# Patient Record
Sex: MALE | Race: OTHER | HISPANIC OR LATINO
[De-identification: names, ages, dates, MRNs, and addresses within clinical notes are randomized per-mention and may not be internally consistent; named-entity substitution may affect disease eponyms.]

---

## 2017-01-11 ENCOUNTER — RX RENEWAL (OUTPATIENT)
Age: 52
End: 2017-01-11

## 2017-01-31 ENCOUNTER — APPOINTMENT (OUTPATIENT)
Dept: HEART AND VASCULAR | Facility: CLINIC | Age: 52
End: 2017-01-31

## 2017-01-31 VITALS
HEIGHT: 69 IN | TEMPERATURE: 98.5 F | SYSTOLIC BLOOD PRESSURE: 110 MMHG | OXYGEN SATURATION: 98 % | BODY MASS INDEX: 29.19 KG/M2 | HEART RATE: 80 BPM | WEIGHT: 197.1 LBS | DIASTOLIC BLOOD PRESSURE: 80 MMHG | RESPIRATION RATE: 12 BRPM

## 2017-02-16 ENCOUNTER — APPOINTMENT (OUTPATIENT)
Dept: HEART AND VASCULAR | Facility: CLINIC | Age: 52
End: 2017-02-16

## 2017-02-16 VITALS
SYSTOLIC BLOOD PRESSURE: 130 MMHG | HEIGHT: 69 IN | TEMPERATURE: 97.3 F | WEIGHT: 197 LBS | BODY MASS INDEX: 29.18 KG/M2 | HEART RATE: 80 BPM | DIASTOLIC BLOOD PRESSURE: 84 MMHG | OXYGEN SATURATION: 95 %

## 2017-02-17 LAB
25(OH)D3 SERPL-MCNC: 29.9 NG/ML
ALBUMIN SERPL ELPH-MCNC: 4.5 G/DL
ALP BLD-CCNC: 74 U/L
ALT SERPL-CCNC: 29 U/L
ANION GAP SERPL CALC-SCNC: 14 MMOL/L
AST SERPL-CCNC: 20 U/L
BASOPHILS # BLD AUTO: 0.05 K/UL
BASOPHILS NFR BLD AUTO: 0.5 %
BILIRUB SERPL-MCNC: 0.4 MG/DL
BUN SERPL-MCNC: 18 MG/DL
CALCIUM SERPL-MCNC: 9.8 MG/DL
CHLORIDE SERPL-SCNC: 100 MMOL/L
CHOLEST SERPL-MCNC: 248 MG/DL
CHOLEST/HDLC SERPL: 5.4 RATIO
CO2 SERPL-SCNC: 27 MMOL/L
CREAT SERPL-MCNC: 1.02 MG/DL
EOSINOPHIL # BLD AUTO: 0.17 K/UL
EOSINOPHIL NFR BLD AUTO: 1.8 %
GLUCOSE SERPL-MCNC: 99 MG/DL
HBA1C MFR BLD HPLC: 5.6 %
HCT VFR BLD CALC: 43 %
HDLC SERPL-MCNC: 46 MG/DL
HGB BLD-MCNC: 13.8 G/DL
IMM GRANULOCYTES NFR BLD AUTO: 0.8 %
LDLC SERPL CALC-MCNC: 167 MG/DL
LYMPHOCYTES # BLD AUTO: 2.46 K/UL
LYMPHOCYTES NFR BLD AUTO: 25.9 %
MAN DIFF?: NORMAL
MCHC RBC-ENTMCNC: 31 PG
MCHC RBC-ENTMCNC: 32.1 GM/DL
MCV RBC AUTO: 96.6 FL
MONOCYTES # BLD AUTO: 0.71 K/UL
MONOCYTES NFR BLD AUTO: 7.5 %
NEUTROPHILS # BLD AUTO: 6.01 K/UL
NEUTROPHILS NFR BLD AUTO: 63.5 %
PLATELET # BLD AUTO: 261 K/UL
POTASSIUM SERPL-SCNC: 4.3 MMOL/L
PROT SERPL-MCNC: 8.5 G/DL
PSA SERPL-MCNC: 1.09 NG/ML
RBC # BLD: 4.45 M/UL
RBC # FLD: 13.5 %
SODIUM SERPL-SCNC: 141 MMOL/L
TESTOST SERPL-MCNC: 287.3 NG/DL
TRIGL SERPL-MCNC: 177 MG/DL
TSH SERPL-ACNC: 1.57 UIU/ML
WBC # FLD AUTO: 9.48 K/UL

## 2017-05-02 ENCOUNTER — RX RENEWAL (OUTPATIENT)
Age: 52
End: 2017-05-02

## 2017-06-05 ENCOUNTER — RX RENEWAL (OUTPATIENT)
Age: 52
End: 2017-06-05

## 2017-06-12 ENCOUNTER — RX RENEWAL (OUTPATIENT)
Age: 52
End: 2017-06-12

## 2017-06-26 ENCOUNTER — APPOINTMENT (OUTPATIENT)
Dept: HEART AND VASCULAR | Facility: CLINIC | Age: 52
End: 2017-06-26

## 2017-06-30 ENCOUNTER — APPOINTMENT (OUTPATIENT)
Dept: HEART AND VASCULAR | Facility: CLINIC | Age: 52
End: 2017-06-30

## 2017-08-07 ENCOUNTER — RX RENEWAL (OUTPATIENT)
Age: 52
End: 2017-08-07

## 2017-09-21 ENCOUNTER — RX RENEWAL (OUTPATIENT)
Age: 52
End: 2017-09-21

## 2017-09-22 ENCOUNTER — RX RENEWAL (OUTPATIENT)
Age: 52
End: 2017-09-22

## 2017-10-16 ENCOUNTER — RX RENEWAL (OUTPATIENT)
Age: 52
End: 2017-10-16

## 2017-11-01 PROBLEM — Z00.00 ENCOUNTER FOR PREVENTIVE HEALTH EXAMINATION: Noted: 2017-11-01

## 2017-11-13 ENCOUNTER — RX RENEWAL (OUTPATIENT)
Age: 52
End: 2017-11-13

## 2017-11-21 ENCOUNTER — RX RENEWAL (OUTPATIENT)
Age: 52
End: 2017-11-21

## 2017-11-28 ENCOUNTER — APPOINTMENT (OUTPATIENT)
Dept: HEART AND VASCULAR | Facility: CLINIC | Age: 52
End: 2017-11-28

## 2017-12-26 ENCOUNTER — RX RENEWAL (OUTPATIENT)
Age: 52
End: 2017-12-26

## 2018-01-02 ENCOUNTER — LABORATORY RESULT (OUTPATIENT)
Age: 53
End: 2018-01-02

## 2018-01-02 ENCOUNTER — APPOINTMENT (OUTPATIENT)
Dept: HEART AND VASCULAR | Facility: CLINIC | Age: 53
End: 2018-01-02
Payer: COMMERCIAL

## 2018-01-02 VITALS — SYSTOLIC BLOOD PRESSURE: 144 MMHG | DIASTOLIC BLOOD PRESSURE: 92 MMHG

## 2018-01-02 VITALS
DIASTOLIC BLOOD PRESSURE: 92 MMHG | BODY MASS INDEX: 29.62 KG/M2 | OXYGEN SATURATION: 97 % | WEIGHT: 200 LBS | TEMPERATURE: 98.3 F | HEIGHT: 69 IN | HEART RATE: 85 BPM | RESPIRATION RATE: 12 BRPM | SYSTOLIC BLOOD PRESSURE: 148 MMHG

## 2018-01-02 PROCEDURE — 93000 ELECTROCARDIOGRAM COMPLETE: CPT

## 2018-01-02 PROCEDURE — 36415 COLL VENOUS BLD VENIPUNCTURE: CPT

## 2018-01-02 PROCEDURE — 99214 OFFICE O/P EST MOD 30 MIN: CPT | Mod: 25

## 2018-01-03 DIAGNOSIS — R77.8 OTHER SPECIFIED ABNORMALITIES OF PLASMA PROTEINS: ICD-10-CM

## 2018-01-03 LAB
25(OH)D3 SERPL-MCNC: 16.6 NG/ML
ALBUMIN SERPL ELPH-MCNC: 4.8 G/DL
ALP BLD-CCNC: 68 U/L
ALT SERPL-CCNC: 26 U/L
ANION GAP SERPL CALC-SCNC: 15 MMOL/L
AST SERPL-CCNC: 22 U/L
BASOPHILS # BLD AUTO: 0.05 K/UL
BASOPHILS NFR BLD AUTO: 0.6 %
BILIRUB SERPL-MCNC: 0.3 MG/DL
BUN SERPL-MCNC: 17 MG/DL
CALCIUM SERPL-MCNC: 10.2 MG/DL
CHLORIDE SERPL-SCNC: 101 MMOL/L
CHOLEST SERPL-MCNC: 244 MG/DL
CHOLEST/HDLC SERPL: 6.1 RATIO
CO2 SERPL-SCNC: 27 MMOL/L
CREAT SERPL-MCNC: 1.06 MG/DL
EOSINOPHIL # BLD AUTO: 0.23 K/UL
EOSINOPHIL NFR BLD AUTO: 2.7 %
GLUCOSE SERPL-MCNC: 94 MG/DL
HBA1C MFR BLD HPLC: 5.4 %
HCT VFR BLD CALC: 44.4 %
HCV AB SER QL: NONREACTIVE
HCV S/CO RATIO: 0.12 S/CO
HDLC SERPL-MCNC: 40 MG/DL
HGB BLD-MCNC: 14.5 G/DL
IMM GRANULOCYTES NFR BLD AUTO: 0.2 %
LDLC SERPL CALC-MCNC: 139 MG/DL
LYMPHOCYTES # BLD AUTO: 2.11 K/UL
LYMPHOCYTES NFR BLD AUTO: 25.1 %
MAN DIFF?: NORMAL
MCHC RBC-ENTMCNC: 30.9 PG
MCHC RBC-ENTMCNC: 32.7 GM/DL
MCV RBC AUTO: 94.5 FL
MEV IGG FLD QL IA: 143 AU/ML
MEV IGG+IGM SER-IMP: POSITIVE
MONOCYTES # BLD AUTO: 0.93 K/UL
MONOCYTES NFR BLD AUTO: 11 %
MUV AB SER-ACNC: POSITIVE
MUV IGG SER QL IA: 12 AU/ML
NEUTROPHILS # BLD AUTO: 5.08 K/UL
NEUTROPHILS NFR BLD AUTO: 60.4 %
PLATELET # BLD AUTO: 219 K/UL
POTASSIUM SERPL-SCNC: 4.4 MMOL/L
PROT SERPL-MCNC: 8.4 G/DL
RBC # BLD: 4.7 M/UL
RBC # FLD: 12.3 %
RUBV IGG FLD-ACNC: 9.9 INDEX
RUBV IGG SER-IMP: POSITIVE
SODIUM SERPL-SCNC: 143 MMOL/L
TESTOST SERPL-MCNC: 338.9 NG/DL
TRIGL SERPL-MCNC: 323 MG/DL
TSH SERPL-ACNC: 3.53 UIU/ML
WBC # FLD AUTO: 8.42 K/UL

## 2018-02-08 ENCOUNTER — APPOINTMENT (OUTPATIENT)
Dept: HEART AND VASCULAR | Facility: CLINIC | Age: 53
End: 2018-02-08

## 2018-03-01 ENCOUNTER — APPOINTMENT (OUTPATIENT)
Dept: HEART AND VASCULAR | Facility: CLINIC | Age: 53
End: 2018-03-01
Payer: COMMERCIAL

## 2018-03-01 VITALS
WEIGHT: 206 LBS | RESPIRATION RATE: 12 BRPM | OXYGEN SATURATION: 98 % | DIASTOLIC BLOOD PRESSURE: 76 MMHG | HEART RATE: 69 BPM | SYSTOLIC BLOOD PRESSURE: 112 MMHG | TEMPERATURE: 97.9 F | HEIGHT: 69 IN | BODY MASS INDEX: 30.51 KG/M2

## 2018-03-01 PROCEDURE — 99214 OFFICE O/P EST MOD 30 MIN: CPT

## 2018-03-05 ENCOUNTER — RX RENEWAL (OUTPATIENT)
Age: 53
End: 2018-03-05

## 2018-06-06 ENCOUNTER — APPOINTMENT (OUTPATIENT)
Dept: HEART AND VASCULAR | Facility: CLINIC | Age: 53
End: 2018-06-06

## 2018-07-03 ENCOUNTER — APPOINTMENT (OUTPATIENT)
Dept: HEART AND VASCULAR | Facility: CLINIC | Age: 53
End: 2018-07-03

## 2018-07-09 ENCOUNTER — RX RENEWAL (OUTPATIENT)
Age: 53
End: 2018-07-09

## 2018-09-07 ENCOUNTER — RX RENEWAL (OUTPATIENT)
Age: 53
End: 2018-09-07

## 2018-11-12 ENCOUNTER — RX RENEWAL (OUTPATIENT)
Age: 53
End: 2018-11-12

## 2019-01-14 ENCOUNTER — RX RENEWAL (OUTPATIENT)
Age: 54
End: 2019-01-14

## 2019-02-12 ENCOUNTER — RX RENEWAL (OUTPATIENT)
Age: 54
End: 2019-02-12

## 2019-03-18 ENCOUNTER — RX RENEWAL (OUTPATIENT)
Age: 54
End: 2019-03-18

## 2019-06-11 ENCOUNTER — APPOINTMENT (OUTPATIENT)
Dept: HEART AND VASCULAR | Facility: CLINIC | Age: 54
End: 2019-06-11
Payer: COMMERCIAL

## 2019-06-11 VITALS — SYSTOLIC BLOOD PRESSURE: 164 MMHG | DIASTOLIC BLOOD PRESSURE: 100 MMHG

## 2019-06-11 VITALS
HEART RATE: 83 BPM | DIASTOLIC BLOOD PRESSURE: 102 MMHG | OXYGEN SATURATION: 6 % | HEIGHT: 67 IN | WEIGHT: 215 LBS | TEMPERATURE: 98.4 F | BODY MASS INDEX: 33.74 KG/M2 | RESPIRATION RATE: 14 BRPM | SYSTOLIC BLOOD PRESSURE: 164 MMHG

## 2019-06-11 DIAGNOSIS — Z00.00 ENCOUNTER FOR GENERAL ADULT MEDICAL EXAMINATION W/OUT ABNORMAL FINDINGS: ICD-10-CM

## 2019-06-11 PROCEDURE — 36415 COLL VENOUS BLD VENIPUNCTURE: CPT

## 2019-06-11 PROCEDURE — 93000 ELECTROCARDIOGRAM COMPLETE: CPT

## 2019-06-11 PROCEDURE — 99214 OFFICE O/P EST MOD 30 MIN: CPT

## 2019-06-11 NOTE — HISTORY OF PRESENT ILLNESS
[FreeTextEntry1] : 53 year male who ran out of medication a few days ago. He had insurance issues that interfered with his ability to followup. He notes having a swelling on his right elbow that is mildly uncomfortable.

## 2019-06-11 NOTE — PHYSICAL EXAM
[General Appearance - Well Developed] : well developed [Normal Appearance] : normal appearance [Well Groomed] : well groomed [General Appearance - Well Nourished] : well nourished [General Appearance - In No Acute Distress] : no acute distress [No Deformities] : no deformities [Normal Conjunctiva] : the conjunctiva exhibited no abnormalities [Normal Oral Mucosa] : normal oral mucosa [No Oral Pallor] : no oral pallor [No Oral Cyanosis] : no oral cyanosis [Respiration, Rhythm And Depth] : normal respiratory rhythm and effort [] : no respiratory distress [Auscultation Breath Sounds / Voice Sounds] : lungs were clear to auscultation bilaterally [Heart Sounds] : normal S1 and S2 [Exaggerated Use Of Accessory Muscles For Inspiration] : no accessory muscle use [Abdomen Soft] : soft [FreeTextEntry1] : right elbow with signs of bursa swelling [Abnormal Walk] : normal gait [Skin Turgor] : normal skin turgor [Oriented To Time, Place, And Person] : oriented to person, place, and time [Affect] : the affect was normal [Mood] : the mood was normal [No Anxiety] : not feeling anxious

## 2019-06-11 NOTE — DISCUSSION/SUMMARY
[FreeTextEntry1] : Hypertension, bursitis--Gradually restart Metoprolol first, then the Enalapril, then the HCTZ, then Nifedipine. Call if dizziness. Followup for BP check and labs followup in 2-3 weeks. Meet Dr Sepulveda in consultation

## 2019-06-12 LAB
25(OH)D3 SERPL-MCNC: 25.1 NG/ML
ALBUMIN SERPL ELPH-MCNC: 4.6 G/DL
ALP BLD-CCNC: 63 U/L
ALT SERPL-CCNC: 21 U/L
ANION GAP SERPL CALC-SCNC: 13 MMOL/L
APPEARANCE: CLEAR
AST SERPL-CCNC: 21 U/L
BASOPHILS # BLD AUTO: 0.05 K/UL
BASOPHILS NFR BLD AUTO: 0.6 %
BILIRUB SERPL-MCNC: 0.3 MG/DL
BILIRUBIN URINE: NEGATIVE
BLOOD URINE: NEGATIVE
BUN SERPL-MCNC: 18 MG/DL
CALCIUM SERPL-MCNC: 9.6 MG/DL
CHLORIDE SERPL-SCNC: 102 MMOL/L
CHOLEST SERPL-MCNC: 223 MG/DL
CHOLEST/HDLC SERPL: 6.2 RATIO
CO2 SERPL-SCNC: 25 MMOL/L
COLOR: NORMAL
CREAT SERPL-MCNC: 0.86 MG/DL
EOSINOPHIL # BLD AUTO: 0.28 K/UL
EOSINOPHIL NFR BLD AUTO: 3.6 %
ESTIMATED AVERAGE GLUCOSE: 117 MG/DL
GLUCOSE QUALITATIVE U: NEGATIVE
GLUCOSE SERPL-MCNC: 87 MG/DL
HBA1C MFR BLD HPLC: 5.7 %
HCT VFR BLD CALC: 44.4 %
HDLC SERPL-MCNC: 36 MG/DL
HGB BLD-MCNC: 13.9 G/DL
IMM GRANULOCYTES NFR BLD AUTO: 0.4 %
KETONES URINE: NEGATIVE
LDLC SERPL CALC-MCNC: 143 MG/DL
LEUKOCYTE ESTERASE URINE: NEGATIVE
LYMPHOCYTES # BLD AUTO: 2.14 K/UL
LYMPHOCYTES NFR BLD AUTO: 27.5 %
MAN DIFF?: NORMAL
MCHC RBC-ENTMCNC: 30.2 PG
MCHC RBC-ENTMCNC: 31.3 GM/DL
MCV RBC AUTO: 96.3 FL
MONOCYTES # BLD AUTO: 0.59 K/UL
MONOCYTES NFR BLD AUTO: 7.6 %
NEUTROPHILS # BLD AUTO: 4.7 K/UL
NEUTROPHILS NFR BLD AUTO: 60.3 %
NITRITE URINE: NEGATIVE
PH URINE: 6
PLATELET # BLD AUTO: 275 K/UL
POTASSIUM SERPL-SCNC: 4 MMOL/L
PROT SERPL-MCNC: 7.9 G/DL
PROTEIN URINE: NEGATIVE
RBC # BLD: 4.61 M/UL
RBC # FLD: 12.2 %
SODIUM SERPL-SCNC: 140 MMOL/L
SPECIFIC GRAVITY URINE: 1.02
TESTOST SERPL-MCNC: 203 NG/DL
TRIGL SERPL-MCNC: 219 MG/DL
TSH SERPL-ACNC: 2.15 UIU/ML
UROBILINOGEN URINE: NORMAL
WBC # FLD AUTO: 7.79 K/UL

## 2019-06-25 ENCOUNTER — APPOINTMENT (OUTPATIENT)
Dept: HEART AND VASCULAR | Facility: CLINIC | Age: 54
End: 2019-06-25

## 2019-07-03 ENCOUNTER — APPOINTMENT (OUTPATIENT)
Dept: HEART AND VASCULAR | Facility: CLINIC | Age: 54
End: 2019-07-03

## 2019-07-03 ENCOUNTER — APPOINTMENT (OUTPATIENT)
Dept: ORTHOPEDIC SURGERY | Facility: CLINIC | Age: 54
End: 2019-07-03
Payer: COMMERCIAL

## 2019-07-03 VITALS — WEIGHT: 215 LBS | BODY MASS INDEX: 33.74 KG/M2 | HEIGHT: 67 IN | RESPIRATION RATE: 14 BRPM

## 2019-07-03 PROCEDURE — 73070 X-RAY EXAM OF ELBOW: CPT | Mod: RT

## 2019-07-03 PROCEDURE — 99243 OFF/OP CNSLTJ NEW/EST LOW 30: CPT

## 2019-07-26 ENCOUNTER — APPOINTMENT (OUTPATIENT)
Dept: HEART AND VASCULAR | Facility: CLINIC | Age: 54
End: 2019-07-26
Payer: COMMERCIAL

## 2019-07-26 VITALS
BODY MASS INDEX: 32.81 KG/M2 | HEIGHT: 67 IN | DIASTOLIC BLOOD PRESSURE: 78 MMHG | OXYGEN SATURATION: 97 % | TEMPERATURE: 98.2 F | HEART RATE: 61 BPM | RESPIRATION RATE: 14 BRPM | SYSTOLIC BLOOD PRESSURE: 134 MMHG | WEIGHT: 209.03 LBS

## 2019-07-26 PROCEDURE — 99214 OFFICE O/P EST MOD 30 MIN: CPT

## 2019-07-26 NOTE — PHYSICAL EXAM
[General Appearance - Well Developed] : well developed [Normal Appearance] : normal appearance [Well Groomed] : well groomed [General Appearance - Well Nourished] : well nourished [No Deformities] : no deformities [General Appearance - In No Acute Distress] : no acute distress [Normal Conjunctiva] : the conjunctiva exhibited no abnormalities [] : no respiratory distress [Respiration, Rhythm And Depth] : normal respiratory rhythm and effort [Exaggerated Use Of Accessory Muscles For Inspiration] : no accessory muscle use [Auscultation Breath Sounds / Voice Sounds] : lungs were clear to auscultation bilaterally [Heart Sounds] : normal S1 and S2 [Abdomen Soft] : soft [Abnormal Walk] : normal gait [FreeTextEntry1] : mild right elbow swelling [Oriented To Time, Place, And Person] : oriented to person, place, and time [Skin Turgor] : normal skin turgor [Affect] : the affect was normal [Mood] : the mood was normal [No Anxiety] : not feeling anxious

## 2019-07-26 NOTE — HISTORY OF PRESENT ILLNESS
[FreeTextEntry1] : 53 year male who saw [facility] for his elbow. He is doing PT. He denies having any new symptoms

## 2019-07-26 NOTE — DISCUSSION/SUMMARY
[FreeTextEntry1] : Hypertension, hyperlipidemia--I asked him to return for Echo, Carotid and stress test

## 2019-09-24 ENCOUNTER — APPOINTMENT (OUTPATIENT)
Dept: HEART AND VASCULAR | Facility: CLINIC | Age: 54
End: 2019-09-24
Payer: COMMERCIAL

## 2019-09-24 VITALS
HEIGHT: 67 IN | TEMPERATURE: 97.7 F | HEART RATE: 81 BPM | OXYGEN SATURATION: 96 % | SYSTOLIC BLOOD PRESSURE: 152 MMHG | DIASTOLIC BLOOD PRESSURE: 92 MMHG | WEIGHT: 210 LBS | RESPIRATION RATE: 14 BRPM | BODY MASS INDEX: 32.96 KG/M2

## 2019-09-24 DIAGNOSIS — I34.0 NONRHEUMATIC MITRAL (VALVE) INSUFFICIENCY: ICD-10-CM

## 2019-09-24 PROCEDURE — 93306 TTE W/DOPPLER COMPLETE: CPT

## 2019-09-24 PROCEDURE — 93015 CV STRESS TEST SUPVJ I&R: CPT

## 2019-09-24 PROCEDURE — 93880 EXTRACRANIAL BILAT STUDY: CPT

## 2019-09-24 NOTE — PHYSICAL EXAM
[General Appearance - Well Developed] : well developed [Normal Appearance] : normal appearance [Well Groomed] : well groomed [No Deformities] : no deformities [General Appearance - Well Nourished] : well nourished [General Appearance - In No Acute Distress] : no acute distress [Normal Conjunctiva] : the conjunctiva exhibited no abnormalities [] : no respiratory distress [Respiration, Rhythm And Depth] : normal respiratory rhythm and effort [Auscultation Breath Sounds / Voice Sounds] : lungs were clear to auscultation bilaterally [Exaggerated Use Of Accessory Muscles For Inspiration] : no accessory muscle use [Abnormal Walk] : normal gait [Heart Sounds] : normal S1 and S2 [Skin Turgor] : normal skin turgor [Oriented To Time, Place, And Person] : oriented to person, place, and time [Affect] : the affect was normal [Mood] : the mood was normal [No Anxiety] : not feeling anxious [FreeTextEntry1] : no edema

## 2019-09-24 NOTE — DISCUSSION/SUMMARY
[FreeTextEntry1] : At the time of the patient's visit a Carotid Doppler was performed to evaluate for PVD\par \par At the time of the patient's visit an Echocardiogram was performed to evaluate his LV function\par \par At the time of the patient's visit a Stress Test was performed to evaluate for exercise induced arrhythmia and ischemia. \par \par At the time of the visit the results were reviewed with patient. Increase Metoprolol Succinate to 100 mg from 50 mg at bedtime

## 2019-12-06 ENCOUNTER — RX RENEWAL (OUTPATIENT)
Age: 54
End: 2019-12-06

## 2019-12-13 NOTE — REASON FOR VISIT
Intermittent nausea and vomiting throughout the night. Mild improvement with PRN medications. Complaints of heartburn and feeling bloated. BM attempted multiple times. VSS. Tolerating 4L NC even with frequent ambulation. Pt able to get a few hours of sleep after receiving morphine. Afebrile. UO adequate but decreased. No appetite. CBGs WNL. Mild chest pain improved with rest. Pt turned independently throughout the night. POC reviewed with pt and family. All questions and concerns addressed.      [Follow-Up - Clinic] : a clinic follow-up of [FreeTextEntry2] : hypertension

## 2020-01-10 ENCOUNTER — RX RENEWAL (OUTPATIENT)
Age: 55
End: 2020-01-10

## 2020-03-17 ENCOUNTER — RX RENEWAL (OUTPATIENT)
Age: 55
End: 2020-03-17

## 2020-05-12 ENCOUNTER — RX RENEWAL (OUTPATIENT)
Age: 55
End: 2020-05-12

## 2020-06-04 ENCOUNTER — RX RENEWAL (OUTPATIENT)
Age: 55
End: 2020-06-04

## 2020-09-08 ENCOUNTER — APPOINTMENT (OUTPATIENT)
Dept: HEART AND VASCULAR | Facility: CLINIC | Age: 55
End: 2020-09-08
Payer: COMMERCIAL

## 2020-09-08 VITALS
WEIGHT: 210 LBS | DIASTOLIC BLOOD PRESSURE: 84 MMHG | SYSTOLIC BLOOD PRESSURE: 130 MMHG | RESPIRATION RATE: 14 BRPM | OXYGEN SATURATION: 96 % | HEART RATE: 97 BPM | BODY MASS INDEX: 32.96 KG/M2 | HEIGHT: 67 IN | TEMPERATURE: 97.7 F

## 2020-09-08 PROCEDURE — 93306 TTE W/DOPPLER COMPLETE: CPT

## 2020-09-08 PROCEDURE — 93000 ELECTROCARDIOGRAM COMPLETE: CPT

## 2020-09-08 PROCEDURE — 93880 EXTRACRANIAL BILAT STUDY: CPT

## 2020-09-08 PROCEDURE — 36415 COLL VENOUS BLD VENIPUNCTURE: CPT

## 2020-09-08 PROCEDURE — 99214 OFFICE O/P EST MOD 30 MIN: CPT | Mod: 25

## 2020-09-08 NOTE — ASSESSMENT
[FreeTextEntry1] : At the time of the patient's visit a Carotid Doppler was performed to evaluate for PVD. At the time of the visit the results were reviewed with patient \par \par At the time of the patient's visit an Echocardiogram was performed to evaluate LV function. At the time of the visit the results were reviewed with patient \par \par Hyperlipidemia, hypertension, LVH, carotid plaque--labs drawn and sent. Start Crestor 5 mg daily

## 2020-09-08 NOTE — PHYSICAL EXAM
[Normal Appearance] : normal appearance [General Appearance - Well Developed] : well developed [Well Groomed] : well groomed [General Appearance - Well Nourished] : well nourished [No Deformities] : no deformities [Normal Conjunctiva] : the conjunctiva exhibited no abnormalities [General Appearance - In No Acute Distress] : no acute distress [] : no respiratory distress [Respiration, Rhythm And Depth] : normal respiratory rhythm and effort [Exaggerated Use Of Accessory Muscles For Inspiration] : no accessory muscle use [Auscultation Breath Sounds / Voice Sounds] : lungs were clear to auscultation bilaterally [Heart Sounds] : normal S1 and S2 [Abnormal Walk] : normal gait [FreeTextEntry1] : no edema [Skin Turgor] : normal skin turgor [Oriented To Time, Place, And Person] : oriented to person, place, and time [Affect] : the affect was normal [Mood] : the mood was normal [No Anxiety] : not feeling anxious

## 2020-09-09 LAB
25(OH)D3 SERPL-MCNC: 36.1 NG/ML
ALBUMIN SERPL ELPH-MCNC: 4.6 G/DL
ALP BLD-CCNC: 80 U/L
ALT SERPL-CCNC: 23 U/L
ANION GAP SERPL CALC-SCNC: 16 MMOL/L
AST SERPL-CCNC: 22 U/L
BASOPHILS # BLD AUTO: 0.04 K/UL
BASOPHILS NFR BLD AUTO: 0.4 %
BILIRUB SERPL-MCNC: 0.2 MG/DL
BUN SERPL-MCNC: 18 MG/DL
CALCIUM SERPL-MCNC: 9.5 MG/DL
CHLORIDE SERPL-SCNC: 105 MMOL/L
CHOLEST SERPL-MCNC: 205 MG/DL
CHOLEST/HDLC SERPL: 5 RATIO
CO2 SERPL-SCNC: 24 MMOL/L
CREAT SERPL-MCNC: 1.15 MG/DL
EOSINOPHIL # BLD AUTO: 0.16 K/UL
EOSINOPHIL NFR BLD AUTO: 1.8 %
ESTIMATED AVERAGE GLUCOSE: 114 MG/DL
GLUCOSE SERPL-MCNC: 108 MG/DL
HBA1C MFR BLD HPLC: 5.6 %
HCT VFR BLD CALC: 45.4 %
HDLC SERPL-MCNC: 41 MG/DL
HGB BLD-MCNC: 14.2 G/DL
IMM GRANULOCYTES NFR BLD AUTO: 0.2 %
LDLC SERPL CALC-MCNC: 123 MG/DL
LYMPHOCYTES # BLD AUTO: 2.1 K/UL
LYMPHOCYTES NFR BLD AUTO: 23.5 %
MAN DIFF?: NORMAL
MCHC RBC-ENTMCNC: 30.5 PG
MCHC RBC-ENTMCNC: 31.3 GM/DL
MCV RBC AUTO: 97.6 FL
MONOCYTES # BLD AUTO: 0.52 K/UL
MONOCYTES NFR BLD AUTO: 5.8 %
NEUTROPHILS # BLD AUTO: 6.08 K/UL
NEUTROPHILS NFR BLD AUTO: 68.3 %
PLATELET # BLD AUTO: 250 K/UL
POTASSIUM SERPL-SCNC: 4.3 MMOL/L
PROT SERPL-MCNC: 7.8 G/DL
RBC # BLD: 4.65 M/UL
RBC # FLD: 12.5 %
SODIUM SERPL-SCNC: 144 MMOL/L
TRIGL SERPL-MCNC: 204 MG/DL
TSH SERPL-ACNC: 2.69 UIU/ML
WBC # FLD AUTO: 8.92 K/UL

## 2020-11-20 ENCOUNTER — APPOINTMENT (OUTPATIENT)
Dept: ORTHOPEDIC SURGERY | Facility: CLINIC | Age: 55
End: 2020-11-20
Payer: COMMERCIAL

## 2020-11-20 VITALS — WEIGHT: 210 LBS | RESPIRATION RATE: 16 BRPM | BODY MASS INDEX: 32.96 KG/M2 | HEIGHT: 67 IN

## 2020-11-20 DIAGNOSIS — M77.11 LATERAL EPICONDYLITIS, RIGHT ELBOW: ICD-10-CM

## 2020-11-20 DIAGNOSIS — M70.21 OLECRANON BURSITIS, RIGHT ELBOW: ICD-10-CM

## 2020-11-20 DIAGNOSIS — M77.8 OTHER ENTHESOPATHIES, NOT ELSEWHERE CLASSIFIED: ICD-10-CM

## 2020-11-20 PROCEDURE — 99214 OFFICE O/P EST MOD 30 MIN: CPT

## 2020-11-20 PROCEDURE — 73070 X-RAY EXAM OF ELBOW: CPT | Mod: LT

## 2021-01-04 ENCOUNTER — APPOINTMENT (OUTPATIENT)
Dept: HEART AND VASCULAR | Facility: CLINIC | Age: 56
End: 2021-01-04
Payer: COMMERCIAL

## 2021-01-04 VITALS
TEMPERATURE: 96.3 F | SYSTOLIC BLOOD PRESSURE: 126 MMHG | WEIGHT: 210 LBS | HEART RATE: 77 BPM | RESPIRATION RATE: 16 BRPM | OXYGEN SATURATION: 98 % | DIASTOLIC BLOOD PRESSURE: 78 MMHG | BODY MASS INDEX: 32.96 KG/M2 | HEIGHT: 67 IN

## 2021-01-04 DIAGNOSIS — I65.29 OCCLUSION AND STENOSIS OF UNSPECIFIED CAROTID ARTERY: ICD-10-CM

## 2021-01-04 DIAGNOSIS — M25.369 OTHER INSTABILITY, UNSPECIFIED KNEE: ICD-10-CM

## 2021-01-04 PROCEDURE — 99214 OFFICE O/P EST MOD 30 MIN: CPT

## 2021-01-04 PROCEDURE — 99072 ADDL SUPL MATRL&STAF TM PHE: CPT

## 2021-01-04 NOTE — HISTORY OF PRESENT ILLNESS
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 No falls, dizziness but a has a sense of his knee being unstable

## 2021-03-02 ENCOUNTER — APPOINTMENT (OUTPATIENT)
Dept: HEART AND VASCULAR | Facility: CLINIC | Age: 56
End: 2021-03-02
Payer: COMMERCIAL

## 2021-03-02 VITALS
TEMPERATURE: 98 F | SYSTOLIC BLOOD PRESSURE: 130 MMHG | WEIGHT: 210 LBS | DIASTOLIC BLOOD PRESSURE: 82 MMHG | RESPIRATION RATE: 16 BRPM | HEIGHT: 67 IN | BODY MASS INDEX: 32.96 KG/M2 | HEART RATE: 79 BPM | OXYGEN SATURATION: 96 %

## 2021-03-02 DIAGNOSIS — M25.569 PAIN IN UNSPECIFIED KNEE: ICD-10-CM

## 2021-03-02 PROCEDURE — 99072 ADDL SUPL MATRL&STAF TM PHE: CPT

## 2021-03-02 PROCEDURE — 99213 OFFICE O/P EST LOW 20 MIN: CPT

## 2021-03-02 NOTE — PHYSICAL EXAM
[General Appearance - Well Developed] : well developed [Normal Appearance] : normal appearance [Well Groomed] : well groomed [General Appearance - Well Nourished] : well nourished [No Deformities] : no deformities [General Appearance - In No Acute Distress] : no acute distress [Normal Conjunctiva] : the conjunctiva exhibited no abnormalities [] : no respiratory distress [Respiration, Rhythm And Depth] : normal respiratory rhythm and effort [Exaggerated Use Of Accessory Muscles For Inspiration] : no accessory muscle use [Auscultation Breath Sounds / Voice Sounds] : lungs were clear to auscultation bilaterally [Heart Sounds] : normal S1 and S2 [FreeTextEntry1] : minimal edema at left shin [Skin Turgor] : normal skin turgor [Oriented To Time, Place, And Person] : oriented to person, place, and time [Affect] : the affect was normal [Mood] : the mood was normal [No Anxiety] : not feeling anxious

## 2021-03-02 NOTE — HISTORY OF PRESENT ILLNESS
[FreeTextEntry1] : 55 year male who is going for OT for his shoulder and was told to see the doctor. He notes having bilateral ankle and knee pain and would like to meet with Ortho. 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

## 2021-03-08 ENCOUNTER — RX RENEWAL (OUTPATIENT)
Age: 56
End: 2021-03-08

## 2021-03-10 ENCOUNTER — RX RENEWAL (OUTPATIENT)
Age: 56
End: 2021-03-10

## 2021-03-17 ENCOUNTER — RESULT REVIEW (OUTPATIENT)
Age: 56
End: 2021-03-17

## 2021-03-17 ENCOUNTER — APPOINTMENT (OUTPATIENT)
Dept: ORTHOPEDIC SURGERY | Facility: CLINIC | Age: 56
End: 2021-03-17
Payer: COMMERCIAL

## 2021-03-17 ENCOUNTER — OUTPATIENT (OUTPATIENT)
Dept: OUTPATIENT SERVICES | Facility: HOSPITAL | Age: 56
LOS: 1 days | End: 2021-03-17
Payer: SELF-PAY

## 2021-03-17 DIAGNOSIS — L40.50 ARTHROPATHIC PSORIASIS, UNSPECIFIED: ICD-10-CM

## 2021-03-17 DIAGNOSIS — M79.671 PAIN IN RIGHT FOOT: ICD-10-CM

## 2021-03-17 PROCEDURE — 73630 X-RAY EXAM OF FOOT: CPT | Mod: 26,50

## 2021-03-17 PROCEDURE — 99072 ADDL SUPL MATRL&STAF TM PHE: CPT

## 2021-03-17 PROCEDURE — 73610 X-RAY EXAM OF ANKLE: CPT

## 2021-03-17 PROCEDURE — 73610 X-RAY EXAM OF ANKLE: CPT | Mod: 26,50

## 2021-03-17 PROCEDURE — 99214 OFFICE O/P EST MOD 30 MIN: CPT

## 2021-03-17 PROCEDURE — 73630 X-RAY EXAM OF FOOT: CPT

## 2021-03-18 VITALS — BODY MASS INDEX: 32.96 KG/M2 | RESPIRATION RATE: 16 BRPM | WEIGHT: 210 LBS | HEIGHT: 67 IN

## 2021-04-05 ENCOUNTER — APPOINTMENT (OUTPATIENT)
Dept: RHEUMATOLOGY | Facility: CLINIC | Age: 56
End: 2021-04-05
Payer: COMMERCIAL

## 2021-04-05 VITALS
TEMPERATURE: 98 F | HEIGHT: 67 IN | OXYGEN SATURATION: 97 % | DIASTOLIC BLOOD PRESSURE: 85 MMHG | BODY MASS INDEX: 33.62 KG/M2 | HEART RATE: 83 BPM | WEIGHT: 214.19 LBS | SYSTOLIC BLOOD PRESSURE: 136 MMHG

## 2021-04-05 DIAGNOSIS — R21 RASH AND OTHER NONSPECIFIC SKIN ERUPTION: ICD-10-CM

## 2021-04-05 PROCEDURE — 99204 OFFICE O/P NEW MOD 45 MIN: CPT | Mod: 25

## 2021-04-05 PROCEDURE — 99072 ADDL SUPL MATRL&STAF TM PHE: CPT

## 2021-04-05 PROCEDURE — 36415 COLL VENOUS BLD VENIPUNCTURE: CPT

## 2021-04-06 LAB
BASOPHILS # BLD AUTO: 0.06 K/UL
BASOPHILS NFR BLD AUTO: 0.6 %
EOSINOPHIL # BLD AUTO: 0.25 K/UL
EOSINOPHIL NFR BLD AUTO: 2.6 %
ERYTHROCYTE [SEDIMENTATION RATE] IN BLOOD BY WESTERGREN METHOD: 44 MM/HR
HCT VFR BLD CALC: 43.6 %
HGB BLD-MCNC: 14.3 G/DL
IMM GRANULOCYTES NFR BLD AUTO: 0.3 %
LYMPHOCYTES # BLD AUTO: 1.8 K/UL
LYMPHOCYTES NFR BLD AUTO: 19 %
MAN DIFF?: NORMAL
MCHC RBC-ENTMCNC: 29.9 PG
MCHC RBC-ENTMCNC: 32.8 GM/DL
MCV RBC AUTO: 91 FL
MONOCYTES # BLD AUTO: 0.73 K/UL
MONOCYTES NFR BLD AUTO: 7.7 %
NEUTROPHILS # BLD AUTO: 6.62 K/UL
NEUTROPHILS NFR BLD AUTO: 69.8 %
PLATELET # BLD AUTO: 239 K/UL
RBC # BLD: 4.79 M/UL
RBC # FLD: 11.9 %
RHEUMATOID FACT SER QL: <10 IU/ML
WBC # FLD AUTO: 9.49 K/UL

## 2021-04-07 LAB
CCP AB SER IA-ACNC: <8 UNITS
CRP SERPL-MCNC: 4 MG/L
RF+CCP IGG SER-IMP: NEGATIVE

## 2021-04-07 NOTE — ASSESSMENT
[FreeTextEntry1] : 55 year old man referred for rheumatology evaluation.  Patient with ankle pain and swelling, hand pain and previous history of olecranon bursitis, with MRI suggestive of inflammatory changes of the ankle. Patient with rash on body as well raising the question of psoriatic arthritis. Will refer to dermatology for evaluation, will check labs today including ESR, CRP, CBC, CMP, and HLA B27.  Discussed addition of possible immunosuppressive agents pending dermatology evaluation, further management pending lab results.

## 2021-04-07 NOTE — DATA REVIEWED
[FreeTextEntry1] : \par  XR FOOT COMPLETE 3 VIEWS BILATERAL             Final\par \par No Documents Attached\par \par \par \par \par   EXAM:  XR FOOT COMP MIN 3 VIEWS BI\par EXAM:  XR ANKLE COMP MIN 3 VIEWS BI\par \par PROCEDURE DATE:  03/17/2021\par \par \par \par \par INTERPRETATION:  CLINICAL HISTORY: 55-year-old with chronic bilateral foot and ankle pain.\par \par \par \par IMPRESSION: Frontal, lateral and oblique views of the left foot and left ankle demonstrates demonstrates plantar and Achilles tendon osteophytes. Hindfoot valgus. Pes planus. Tibiotalar joint is in appropriate alignment. TMT joint is in appropriate alignment. Appropriate osseous mineralization. No fracture. No dislocation. Large effusion intra-articular loose bodies of the tibiotalar joint.\par \par Frontal, lateral and oblique views of the right foot and right ankle demonstrates hindfoot valgus with pes planus. Plantar and Achilles tendon enthesophytes. Osteophytosis and intra-articular loose bodies of the tibiotalar joint. Tibiotalar joint is in appropriate alignment. Fragmented osteophytes of the dorsal aspect of the talonavicular joint. Plantar and Achilles tendon enthesophytes. Tibiotalar joint is in appropriate alignment.\par \par \par \par \par \par \par \par \par Dr. Macrina Thompson can be reached at [email or web address] with any questions regarding this report.\par \par \par \par \par \par BANDAR ROMO M.D., RADIOLOGY RESIDENT\par This document has been electronically signed.\par MACRINA THOMPSON MD; Attending Radiologist\par This document has been electronically signed. Mar 17 2021  1:56PM\par \par  \par \par  Ordered by: FARHAT CASTILLO       Collected/Examined: 17Mar2021 01:40PM       \par Verified by: FARAHT CASTILLO 18Mar2021 07:35AM       \par  Result Communication: No patient communication needed at this time;\par Stage: Final       \par  Performed at: [facility]       Resulted: 17Mar2021 01:53PM       Last Updated: 18Mar2021 07:35AM       Accession: [id number]       \par \par \par  XR ANKLE COMPLETE 3 VIEWS BILATERAL             Final\par \par No Documents Attached\par \par \par \par \par   EXAM:  XR FOOT COMP MIN 3 VIEWS BI\par EXAM:  XR ANKLE COMP MIN 3 VIEWS BI\par \par PROCEDURE DATE:  03/17/2021\par \par \par \par \par INTERPRETATION:  CLINICAL HISTORY: 55-year-old with chronic bilateral foot and ankle pain.\par \par \par \par IMPRESSION: Frontal, lateral and oblique views of the left foot and left ankle demonstrates demonstrates plantar and Achilles tendon osteophytes. Hindfoot valgus. Pes planus. Tibiotalar joint is in appropriate alignment. TMT joint is in appropriate alignment. Appropriate osseous mineralization. No fracture. No dislocation. Large effusion intra-articular loose bodies of the tibiotalar joint.\par \par Frontal, lateral and oblique views of the right foot and right ankle demonstrates hindfoot valgus with pes planus. Plantar and Achilles tendon enthesophytes. Osteophytosis and intra-articular loose bodies of the tibiotalar joint. Tibiotalar joint is in appropriate alignment. Fragmented osteophytes of the dorsal aspect of the talonavicular joint. Plantar and Achilles tendon enthesophytes. Tibiotalar joint is in appropriate alignment.\par \par \par \par \par \par \par \par \par Dr. Macrina Thompson can be reached at [email or web address] with any questions regarding this report.\par \par \par \par \par \par BANDAR ROMO M.D., RADIOLOGY RESIDENT\par This document has been electronically signed.\par MACRINA THOMPSON MD; Attending Radiologist\par This document has been electronically signed. Mar 17 2021  1:56PM\par \par  \par \par  Ordered by: FARHAT CASTILLO       Collected/Examined: 17Mar2021 01:40PM       \par Verified by: FARHAT CASTILLO 18Mar2021 07:35AM       \par  Result Communication: No patient communication needed at this time;\par Stage: Final       \par  Performed at: [facility]       Resulted: 17Mar2021 01:53PM       Last Updated: 18Mar2021 07:35AM       Accession: [id number]       \par \par MRI 3/29/2021\par right foot: \par Small erosion of the lateral side of the third metatarsal head, with some adjacent reactive marrow edema.  this may be a relevant finding in this patient with clinically suspected psoriatic arthropathy.  No other erosions are seen. Early nonspecific arthrosis of the fourth TMT joint. Subcortical marrow edema at the plantar aspect of the anterior talar articular plate may be medial reactive in nature or perhaps related to a minimal impaction injury. Small ganglion cyst at the dorsal/medial aspect of the first MPJ.\par

## 2021-04-07 NOTE — HISTORY OF PRESENT ILLNESS
[FreeTextEntry1] : 55 year old man referred for rheumatology evaluation. 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 possible arthritis\par Pain in the right foot and ankle as well\par Feels changes in the toenails\par \par One year ago, joint pains started\par Takes advil for the pain, sometimes takes two advil, which helps a little bit\par History of olecranon bursitis, evaluated one year ago by orthopedist for right olecranon bursitis, most recently with left olecranon bursa symptoms \par No previous cortisone injections in the hands or elbows previously treated with OT, had cortisone injection in the  in the right knee more than five years ago, which help\par \par no smoking, social beer\par \par Also with rash on the body, hyperpigmentation with annular lesions, +pruritic, no scale noted.\par No previous dermatology evaluation, will refer to dermatology for evaluation of possible psoriasis.\par \par XRAYs of the MRI of the

## 2021-04-07 NOTE — PHYSICAL EXAM
[General Appearance - Alert] : alert [General Appearance - In No Acute Distress] : in no acute distress [General Appearance - Well-Appearing] : healthy appearing [Sclera] : the sclera and conjunctiva were normal [] : no respiratory distress [Respiration, Rhythm And Depth] : normal respiratory rhythm and effort [Exaggerated Use Of Accessory Muscles For Inspiration] : no accessory muscle use [Abnormal Walk] : normal gait [FreeTextEntry1] : minimal enlargement of the DIPs without tenderness,  edema of the ankles bilaterally.  olecranon bursa without effusion.

## 2021-04-07 NOTE — CONSULT LETTER
[Dear  ___] : Dear  [unfilled], [Consult Letter:] : I had the pleasure of evaluating your patient, [unfilled]. [Please see my note below.] : Please see my note below. [Consult Closing:] : Thank you very much for allowing me to participate in the care of this patient.  If you have any questions, please do not hesitate to contact me. [Sincerely,] : Sincerely, [FreeTextEntry3] : Trini Jc MD, MPH

## 2021-04-07 NOTE — REVIEW OF SYSTEMS
[Arthralgias] : arthralgias [Joint Pain] : joint pain [Joint Swelling] : joint swelling [Joint Stiffness] : joint stiffness [Skin Lesions] : skin lesion [Negative] : Heme/Lymph

## 2021-04-13 ENCOUNTER — RX RENEWAL (OUTPATIENT)
Age: 56
End: 2021-04-13

## 2021-04-16 LAB
ALBUMIN SERPL ELPH-MCNC: 4.9 G/DL
ALP BLD-CCNC: 90 U/L
ALT SERPL-CCNC: 28 U/L
ANION GAP SERPL CALC-SCNC: 14 MMOL/L
AST SERPL-CCNC: 22 U/L
BILIRUB SERPL-MCNC: 0.3 MG/DL
BUN SERPL-MCNC: 22 MG/DL
CALCIUM SERPL-MCNC: 10.1 MG/DL
CHLORIDE SERPL-SCNC: 102 MMOL/L
CO2 SERPL-SCNC: 25 MMOL/L
CREAT SERPL-MCNC: 0.8 MG/DL
GLUCOSE SERPL-MCNC: 107 MG/DL
POTASSIUM SERPL-SCNC: 4.4 MMOL/L
PROT SERPL-MCNC: 8.6 G/DL
SODIUM SERPL-SCNC: 141 MMOL/L

## 2021-04-19 ENCOUNTER — APPOINTMENT (OUTPATIENT)
Dept: RHEUMATOLOGY | Facility: CLINIC | Age: 56
End: 2021-04-19
Payer: COMMERCIAL

## 2021-04-19 VITALS — DIASTOLIC BLOOD PRESSURE: 88 MMHG | SYSTOLIC BLOOD PRESSURE: 133 MMHG

## 2021-04-19 VITALS
HEART RATE: 80 BPM | SYSTOLIC BLOOD PRESSURE: 154 MMHG | HEIGHT: 67 IN | OXYGEN SATURATION: 98 % | BODY MASS INDEX: 33.59 KG/M2 | WEIGHT: 214 LBS | TEMPERATURE: 97.8 F | DIASTOLIC BLOOD PRESSURE: 94 MMHG

## 2021-04-19 PROCEDURE — 36415 COLL VENOUS BLD VENIPUNCTURE: CPT

## 2021-04-19 PROCEDURE — 99072 ADDL SUPL MATRL&STAF TM PHE: CPT

## 2021-04-20 ENCOUNTER — TRANSCRIPTION ENCOUNTER (OUTPATIENT)
Age: 56
End: 2021-04-20

## 2021-04-20 ENCOUNTER — APPOINTMENT (OUTPATIENT)
Dept: ORTHOPEDIC SURGERY | Facility: CLINIC | Age: 56
End: 2021-04-20
Payer: COMMERCIAL

## 2021-04-20 VITALS — WEIGHT: 214 LBS | RESPIRATION RATE: 16 BRPM | BODY MASS INDEX: 33.59 KG/M2 | HEIGHT: 67 IN

## 2021-04-20 DIAGNOSIS — M17.0 BILATERAL PRIMARY OSTEOARTHRITIS OF KNEE: ICD-10-CM

## 2021-04-20 PROCEDURE — 99072 ADDL SUPL MATRL&STAF TM PHE: CPT

## 2021-04-20 PROCEDURE — 99214 OFFICE O/P EST MOD 30 MIN: CPT

## 2021-04-20 PROCEDURE — 73564 X-RAY EXAM KNEE 4 OR MORE: CPT | Mod: RT

## 2021-04-21 ENCOUNTER — APPOINTMENT (OUTPATIENT)
Dept: DERMATOLOGY | Facility: CLINIC | Age: 56
End: 2021-04-21
Payer: COMMERCIAL

## 2021-04-21 ENCOUNTER — LABORATORY RESULT (OUTPATIENT)
Age: 56
End: 2021-04-21

## 2021-04-21 DIAGNOSIS — L60.3 NAIL DYSTROPHY: ICD-10-CM

## 2021-04-21 LAB
ALBUMIN MFR SERPL ELPH: 54.9 %
ALBUMIN SERPL-MCNC: 4.6 G/DL
ALBUMIN/GLOB SERPL: 1.2 RATIO
ALPHA1 GLOB MFR SERPL ELPH: 3 %
ALPHA1 GLOB SERPL ELPH-MCNC: 0.2 G/DL
ALPHA2 GLOB MFR SERPL ELPH: 7.2 %
ALPHA2 GLOB SERPL ELPH-MCNC: 0.6 G/DL
B-GLOBULIN MFR SERPL ELPH: 13.5 %
B-GLOBULIN SERPL ELPH-MCNC: 1.1 G/DL
GAMMA GLOB FLD ELPH-MCNC: 1.8 G/DL
GAMMA GLOB MFR SERPL ELPH: 21.4 %
INTERPRETATION SERPL IEP-IMP: NORMAL
PROT SERPL-MCNC: 8.3 G/DL
PROT SERPL-MCNC: 8.3 G/DL

## 2021-04-21 PROCEDURE — 99072 ADDL SUPL MATRL&STAF TM PHE: CPT

## 2021-04-21 PROCEDURE — 99204 OFFICE O/P NEW MOD 45 MIN: CPT

## 2021-04-21 NOTE — PHYSICAL EXAM
[Alert] : alert [Oriented x 3] : ~L oriented x 3 [Well Nourished] : well nourished [Conjunctiva Non-injected] : conjunctiva non-injected [No Visual Lymphadenopathy] : no visual  lymphadenopathy [No Clubbing] : no clubbing [No Edema] : no edema [No Bromhidrosis] : no bromhidrosis [No Chromhidrosis] : no chromhidrosis [FreeTextEntry3] : medium skin tone\par rough skin colored to pink nummular plaques upper bilateral back, anterior upper chest, L ankle and first dorsal webspace\par normal finger nails with swelling all 10 fingers\par some thickening and dystrophy toenails

## 2021-04-21 NOTE — DATA REVIEWED
[FreeTextEntry1] : \par  XR FOOT COMPLETE 3 VIEWS BILATERAL             Final\par \par No Documents Attached\par \par \par \par \par   EXAM:  XR FOOT COMP MIN 3 VIEWS BI\par EXAM:  XR ANKLE COMP MIN 3 VIEWS BI\par \par PROCEDURE DATE:  03/17/2021\par \par \par \par \par INTERPRETATION:  CLINICAL HISTORY: 55-year-old with chronic bilateral foot and ankle pain.\par \par \par \par IMPRESSION: Frontal, lateral and oblique views of the left foot and left ankle demonstrates demonstrates plantar and Achilles tendon osteophytes. Hindfoot valgus. Pes planus. Tibiotalar joint is in appropriate alignment. TMT joint is in appropriate alignment. Appropriate osseous mineralization. No fracture. No dislocation. Large effusion intra-articular loose bodies of the tibiotalar joint.\par \par Frontal, lateral and oblique views of the right foot and right ankle demonstrates hindfoot valgus with pes planus. Plantar and Achilles tendon enthesophytes. Osteophytosis and intra-articular loose bodies of the tibiotalar joint. Tibiotalar joint is in appropriate alignment. Fragmented osteophytes of the dorsal aspect of the talonavicular joint. Plantar and Achilles tendon enthesophytes. Tibiotalar joint is in appropriate alignment.\par \par \par \par \par \par \par \par \par Dr. Macrina Thompson can be reached at [email or web address] with any questions regarding this report.\par \par \par \par \par \par BANDAR ROMO M.D., RADIOLOGY RESIDENT\par This document has been electronically signed.\par MACRINA THOMPSON MD; Attending Radiologist\par This document has been electronically signed. Mar 17 2021  1:56PM\par \par  \par \par  Ordered by: FARHAT CASTILLO       Collected/Examined: 17Mar2021 01:40PM       \par Verified by: FARHAT CASTILLO 18Mar2021 07:35AM       \par  Result Communication: No patient communication needed at this time;\par Stage: Final       \par  Performed at: [facility]       Resulted: 17Mar2021 01:53PM       Last Updated: 18Mar2021 07:35AM       Accession: [id number]       \par \par \par  XR ANKLE COMPLETE 3 VIEWS BILATERAL             Final\par \par No Documents Attached\par \par \par \par \par   EXAM:  XR FOOT COMP MIN 3 VIEWS BI\par EXAM:  XR ANKLE COMP MIN 3 VIEWS BI\par \par PROCEDURE DATE:  03/17/2021\par \par \par \par \par INTERPRETATION:  CLINICAL HISTORY: 55-year-old with chronic bilateral foot and ankle pain.\par \par \par \par IMPRESSION: Frontal, lateral and oblique views of the left foot and left ankle demonstrates demonstrates plantar and Achilles tendon osteophytes. Hindfoot valgus. Pes planus. Tibiotalar joint is in appropriate alignment. TMT joint is in appropriate alignment. Appropriate osseous mineralization. No fracture. No dislocation. Large effusion intra-articular loose bodies of the tibiotalar joint.\par \par Frontal, lateral and oblique views of the right foot and right ankle demonstrates hindfoot valgus with pes planus. Plantar and Achilles tendon enthesophytes. Osteophytosis and intra-articular loose bodies of the tibiotalar joint. Tibiotalar joint is in appropriate alignment. Fragmented osteophytes of the dorsal aspect of the talonavicular joint. Plantar and Achilles tendon enthesophytes. Tibiotalar joint is in appropriate alignment.\par \par \par \par \par \par \par \par \par Dr. Macrina Thompson can be reached at [email or web address] with any questions regarding this report.\par \par \par \par \par \par BANDAR ROMO M.D., RADIOLOGY RESIDENT\par This document has been electronically signed.\par MACRINA THOMPSON MD; Attending Radiologist\par This document has been electronically signed. Mar 17 2021  1:56PM\par \par  \par \par  Ordered by: FARHAT CASTILLO       Collected/Examined: 17Mar2021 01:40PM       \par Verified by: FARHAT CASTILLO 18Mar2021 07:35AM       \par  Result Communication: No patient communication needed at this time;\par Stage: Final       \par  Performed at: [facility]       Resulted: 17Mar2021 01:53PM       Last Updated: 18Mar2021 07:35AM       Accession: [id number]       \par \par MRI 3/29/2021\par right foot: \par Small erosion of the lateral side of the third metatarsal head, with some adjacent reactive marrow edema.  this may be a relevant finding in this patient with clinically suspected psoriatic arthropathy.  No other erosions are seen. Early nonspecific arthrosis of the fourth TMT joint. Subcortical marrow edema at the plantar aspect of the anterior talar articular plate may be medial reactive in nature or perhaps related to a minimal impaction injury. Small ganglion cyst at the dorsal/medial aspect of the first MPJ.\par

## 2021-04-21 NOTE — ASSESSMENT
[FreeTextEntry1] : 55 year old man returns for rheumatology follow up and follow up blood test results.  Patient with ankle pain and swelling, hand pain and previous history of olecranon bursitis, with MRI suggestive of inflammatory changes of the ankle. Patient with rash on body as well raising the question of psoriatic arthritis. Awaiting dermatology evaluation, appointment scheduled for April 21, 2021.  Will start meloxicam 15 mg qday with food for two weeks and will reevaluate.   \par

## 2021-04-21 NOTE — HISTORY OF PRESENT ILLNESS
[FreeTextEntry1] : April 5, 2021\par 55 year old man referred for rheumatology evaluation. 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 possible arthritis\par Pain in the right foot and ankle as well\par Feels changes in the toenails\par \par One year ago, joint pains started\par Takes advil for the pain, sometimes takes two advil, which helps a little bit\par History of olecranon bursitis, evaluated one year ago by orthopedist for right olecranon bursitis, most recently with left olecranon bursa symptoms \par No previous cortisone injections in the hands or elbows previously treated with OT, had cortisone injection in the  in the right knee more than five years ago, which help\par \par no smoking, social beer\par \par Also with rash on the body, hyperpigmentation with annular lesions, +pruritic, no scale noted.\par No previous dermatology evaluation, will refer to dermatology for evaluation of possible psoriasis.\par \par April 19, 2021\par Patient returns for follow up \par Reviewed labs with patient\par Blood pressure initially elevated, repeat blood pressure better \par Pain persists in the right wrist and hand\par No medications for pain at this time.\par Patient has appointment with orthopedist tomorrow to evaluate for bilateral knee pain and dermatologist on Wednesday\par Rashes persist on the upper back associated with pruritus

## 2021-04-21 NOTE — PHYSICAL EXAM
[General Appearance - In No Acute Distress] : in no acute distress [General Appearance - Alert] : alert [General Appearance - Well-Appearing] : healthy appearing [Sclera] : the sclera and conjunctiva were normal [] : no respiratory distress [Respiration, Rhythm And Depth] : normal respiratory rhythm and effort [Exaggerated Use Of Accessory Muscles For Inspiration] : no accessory muscle use [Abnormal Walk] : normal gait [Nail Clubbing] : no clubbing  or cyanosis of the fingernails [Oriented To Time, Place, And Person] : oriented to person, place, and time [Impaired Insight] : insight and judgment were intact [Affect] : the affect was normal [FreeTextEntry1] : skin lesions on the upper back, feet, minimal trunk lesions

## 2021-04-21 NOTE — HISTORY OF PRESENT ILLNESS
[FreeTextEntry1] : rash [de-identified] : 56 yo M referred by Dr Jc for eval of possible psoriasis\par \par  # [id number]\par joint pain started 3 mos ago in feet and upper and chest\par also started to develop rash around the same time on feet and back\par very itchy and painful sometimes\par uses ivory soap, occasional alcohol on skin when itchy\par works in construction\par no fh psoriasis\par no rx tried, only otc hydrocortisone

## 2021-04-22 PROBLEM — M17.0 DEGENERATIVE ARTHRITIS OF KNEE, BILATERAL: Status: ACTIVE | Noted: 2021-04-22

## 2021-04-26 LAB — HLA-B27 RELATED AG QL: NEGATIVE

## 2021-04-30 ENCOUNTER — APPOINTMENT (OUTPATIENT)
Dept: ORTHOPEDIC SURGERY | Facility: CLINIC | Age: 56
End: 2021-04-30
Payer: COMMERCIAL

## 2021-04-30 DIAGNOSIS — G57.91 UNSPECIFIED MONONEUROPATHY OF RIGHT LOWER LIMB: ICD-10-CM

## 2021-04-30 DIAGNOSIS — M76.822 POSTERIOR TIBIAL TENDINITIS, LEFT LEG: ICD-10-CM

## 2021-04-30 DIAGNOSIS — M77.41 METATARSALGIA, RIGHT FOOT: ICD-10-CM

## 2021-04-30 DIAGNOSIS — M79.672 PAIN IN LEFT FOOT: ICD-10-CM

## 2021-04-30 DIAGNOSIS — S91.331A PUNCTURE WOUND W/OUT FOREIGN BODY, RIGHT FOOT, INITIAL ENCOUNTER: ICD-10-CM

## 2021-04-30 PROCEDURE — 73630 X-RAY EXAM OF FOOT: CPT | Mod: LT

## 2021-04-30 PROCEDURE — 73610 X-RAY EXAM OF ANKLE: CPT | Mod: LT

## 2021-04-30 PROCEDURE — 99072 ADDL SUPL MATRL&STAF TM PHE: CPT

## 2021-04-30 PROCEDURE — 99214 OFFICE O/P EST MOD 30 MIN: CPT

## 2021-05-04 ENCOUNTER — APPOINTMENT (OUTPATIENT)
Dept: NEUROLOGY | Facility: CLINIC | Age: 56
End: 2021-05-04

## 2021-05-04 PROBLEM — S91.331A PUNCTURE WOUND OF RIGHT FOOT, INITIAL ENCOUNTER: Status: ACTIVE | Noted: 2021-05-04

## 2021-05-04 PROBLEM — G57.91 NEURITIS OF RIGHT ANKLE: Status: ACTIVE | Noted: 2021-03-17

## 2021-05-13 ENCOUNTER — APPOINTMENT (OUTPATIENT)
Dept: RHEUMATOLOGY | Facility: CLINIC | Age: 56
End: 2021-05-13
Payer: COMMERCIAL

## 2021-05-13 VITALS
HEART RATE: 85 BPM | SYSTOLIC BLOOD PRESSURE: 142 MMHG | TEMPERATURE: 97.5 F | HEIGHT: 67 IN | WEIGHT: 208 LBS | DIASTOLIC BLOOD PRESSURE: 86 MMHG | OXYGEN SATURATION: 98 % | BODY MASS INDEX: 32.65 KG/M2

## 2021-05-13 DIAGNOSIS — M25.50 PAIN IN UNSPECIFIED JOINT: ICD-10-CM

## 2021-05-13 PROCEDURE — 99213 OFFICE O/P EST LOW 20 MIN: CPT

## 2021-05-13 PROCEDURE — 99072 ADDL SUPL MATRL&STAF TM PHE: CPT

## 2021-05-21 NOTE — ASSESSMENT
[FreeTextEntry1] : 55 year old man returns for rheumatology follow up and follow up blood test results.  Patient with ankle pain and swelling, hand pain and previous history of olecranon bursitis, with MRI suggestive of inflammatory changes of the ankle. Rash does not appear consistent with psoriasis as per dermatology note, improved with topical steroids.  Patient with minimal improvement with nsaids, trial of methotrexate 10 mg qweek, increasing to 20 mg qweek as tolerated.  Start folic acid 1 mg qday as well.  Will follow up in three weeks to repeat labs and continue to titrate dose as tolerated.

## 2021-05-21 NOTE — HISTORY OF PRESENT ILLNESS
[FreeTextEntry1] : April 5, 2021\par 55 year old man referred for rheumatology evaluation. 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 possible arthritis\par Pain in the right foot and ankle as well\par Feels changes in the toenails\par \par One year ago, joint pains started\par Takes advil for the pain, sometimes takes two advil, which helps a little bit\par History of olecranon bursitis, evaluated one year ago by orthopedist for right olecranon bursitis, most recently with left olecranon bursa symptoms \par No previous cortisone injections in the hands or elbows previously treated with OT, had cortisone injection in the  in the right knee more than five years ago, which help\par \par no smoking, social beer\par \par Also with rash on the body, hyperpigmentation with annular lesions, +pruritic, no scale noted.\par No previous dermatology evaluation, will refer to dermatology for evaluation of possible psoriasis.\par \par April 19, 2021\par Patient returns for follow up \par Reviewed labs with patient\par Blood pressure initially elevated, repeat blood pressure better \par Pain persists in the right wrist and hand\par No medications for pain at this time.\par Patient has appointment with orthopedist tomorrow to evaluate for bilateral knee pain and dermatologist on Wednesday\par Rashes persist on the upper back associated with pruritus\par \par May 13, 2021\par Patient returns to review results and discuss treatment options.  \par Discussed dermatology evaluation.  Rash improved with topical steroids.\par Trial of MTX discussed, minimal improvement with meloxicam\par Discussed risks and benefits of MTX with patient.

## 2021-05-21 NOTE — PHYSICAL EXAM
[General Appearance - Alert] : alert [General Appearance - Well-Appearing] : healthy appearing [] : no respiratory distress [Exaggerated Use Of Accessory Muscles For Inspiration] : no accessory muscle use

## 2021-06-02 ENCOUNTER — APPOINTMENT (OUTPATIENT)
Dept: DERMATOLOGY | Facility: CLINIC | Age: 56
End: 2021-06-02
Payer: COMMERCIAL

## 2021-06-02 DIAGNOSIS — L30.0 NUMMULAR DERMATITIS: ICD-10-CM

## 2021-06-02 PROCEDURE — 99213 OFFICE O/P EST LOW 20 MIN: CPT

## 2021-06-02 PROCEDURE — 99072 ADDL SUPL MATRL&STAF TM PHE: CPT

## 2021-06-02 RX ORDER — BETAMETHASONE DIPROPIONATE 0.5 MG/G
0.05 CREAM, AUGMENTED TOPICAL
Qty: 1 | Refills: 2 | Status: ACTIVE | COMMUNITY
Start: 2021-04-21 | End: 1900-01-01

## 2021-06-02 NOTE — PHYSICAL EXAM
[Alert] : alert [Oriented x 3] : ~L oriented x 3 [Well Nourished] : well nourished [Conjunctiva Non-injected] : conjunctiva non-injected [No Visual Lymphadenopathy] : no visual  lymphadenopathy [No Clubbing] : no clubbing [No Edema] : no edema [No Bromhidrosis] : no bromhidrosis [No Chromhidrosis] : no chromhidrosis [FreeTextEntry3] : PIH/PIE and thin eczematous nummular plaques upper back\par L 2nd toenail thick dystrophic - normal regrowth proximal

## 2021-06-02 NOTE — HISTORY OF PRESENT ILLNESS
[FreeTextEntry1] : fu nummular dermatitis [de-identified] : improved with change of soap to Ivory, avoiding hot water, and using betamethasone\par no longer itchy\par nail clipping negative for fungus - wearing boots looser \par Dr Jc recently started on MTX for joint pain

## 2021-06-08 ENCOUNTER — APPOINTMENT (OUTPATIENT)
Dept: RHEUMATOLOGY | Facility: CLINIC | Age: 56
End: 2021-06-08

## 2021-06-11 ENCOUNTER — APPOINTMENT (OUTPATIENT)
Dept: ORTHOPEDIC SURGERY | Facility: CLINIC | Age: 56
End: 2021-06-11

## 2021-06-15 ENCOUNTER — APPOINTMENT (OUTPATIENT)
Dept: RHEUMATOLOGY | Facility: CLINIC | Age: 56
End: 2021-06-15
Payer: COMMERCIAL

## 2021-06-15 VITALS
DIASTOLIC BLOOD PRESSURE: 91 MMHG | SYSTOLIC BLOOD PRESSURE: 150 MMHG | BODY MASS INDEX: 39.01 KG/M2 | HEIGHT: 62 IN | HEART RATE: 73 BPM | TEMPERATURE: 97.7 F | OXYGEN SATURATION: 98 % | WEIGHT: 212 LBS

## 2021-06-15 PROCEDURE — 99072 ADDL SUPL MATRL&STAF TM PHE: CPT

## 2021-06-15 PROCEDURE — 36415 COLL VENOUS BLD VENIPUNCTURE: CPT

## 2021-06-15 PROCEDURE — 99213 OFFICE O/P EST LOW 20 MIN: CPT | Mod: 25

## 2021-06-16 LAB
ALBUMIN SERPL ELPH-MCNC: 4.7 G/DL
ALP BLD-CCNC: 70 U/L
ALT SERPL-CCNC: 36 U/L
ANION GAP SERPL CALC-SCNC: 13 MMOL/L
AST SERPL-CCNC: 25 U/L
BASOPHILS # BLD AUTO: 0.05 K/UL
BASOPHILS NFR BLD AUTO: 0.8 %
BILIRUB SERPL-MCNC: 0.4 MG/DL
BUN SERPL-MCNC: 22 MG/DL
CALCIUM SERPL-MCNC: 10.2 MG/DL
CCP AB SER IA-ACNC: <8 UNITS
CHLORIDE SERPL-SCNC: 102 MMOL/L
CO2 SERPL-SCNC: 25 MMOL/L
CREAT SERPL-MCNC: 0.97 MG/DL
EOSINOPHIL # BLD AUTO: 0.15 K/UL
EOSINOPHIL NFR BLD AUTO: 2.3 %
GLUCOSE SERPL-MCNC: 106 MG/DL
HCT VFR BLD CALC: 45.5 %
HGB BLD-MCNC: 14.7 G/DL
IMM GRANULOCYTES NFR BLD AUTO: 0.2 %
LYMPHOCYTES # BLD AUTO: 1.63 K/UL
LYMPHOCYTES NFR BLD AUTO: 24.8 %
MAN DIFF?: NORMAL
MCHC RBC-ENTMCNC: 30.4 PG
MCHC RBC-ENTMCNC: 32.3 GM/DL
MCV RBC AUTO: 94.2 FL
MONOCYTES # BLD AUTO: 0.9 K/UL
MONOCYTES NFR BLD AUTO: 13.7 %
NEUTROPHILS # BLD AUTO: 3.82 K/UL
NEUTROPHILS NFR BLD AUTO: 58.2 %
PLATELET # BLD AUTO: 227 K/UL
POTASSIUM SERPL-SCNC: 5 MMOL/L
PROT SERPL-MCNC: 8.3 G/DL
RBC # BLD: 4.83 M/UL
RBC # FLD: 12.9 %
RF+CCP IGG SER-IMP: NEGATIVE
SODIUM SERPL-SCNC: 139 MMOL/L
WBC # FLD AUTO: 6.56 K/UL

## 2021-06-18 NOTE — PHYSICAL EXAM
[General Appearance - Alert] : alert [General Appearance - In No Acute Distress] : in no acute distress [General Appearance - Well-Appearing] : healthy appearing [] : no rash [Skin Lesions] : no skin lesions [Impaired Insight] : insight and judgment were intact [Oriented To Time, Place, And Person] : oriented to person, place, and time [FreeTextEntry1] : much improved

## 2021-06-18 NOTE — HISTORY OF PRESENT ILLNESS
[FreeTextEntry1] : April 5, 2021\par 55 year old man referred for rheumatology evaluation. 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 possible arthritis\par Pain in the right foot and ankle as well\par Feels changes in the toenails\par \par One year ago, joint pains started\par Takes advil for the pain, sometimes takes two advil, which helps a little bit\par History of olecranon bursitis, evaluated one year ago by orthopedist for right olecranon bursitis, most recently with left olecranon bursa symptoms \par No previous cortisone injections in the hands or elbows previously treated with OT, had cortisone injection in the  in the right knee more than five years ago, which help\par \par no smoking, social beer\par \par Also with rash on the body, hyperpigmentation with annular lesions, +pruritic, no scale noted.\par No previous dermatology evaluation, will refer to dermatology for evaluation of possible psoriasis.\par \par April 19, 2021\par Patient returns for follow up \par Reviewed labs with patient\par Blood pressure initially elevated, repeat blood pressure better \par Pain persists in the right wrist and hand\par No medications for pain at this time.\par Patient has appointment with orthopedist tomorrow to evaluate for bilateral knee pain and dermatologist on Wednesday\par Rashes persist on the upper back associated with pruritus\par \par May 13, 2021\par Patient returns to review results and discuss treatment options.  \par Discussed dermatology evaluation.  Rash improved with topical steroids.\par Trial of MTX discussed, minimal improvement with meloxicam\par Discussed risks and benefits of MTX with patient.\par \par Iraida 15, 2021\par Patient returns for follow up\par Feeling better since starting methotrexate but still with some pain and stiffness\par No side effects from methotrexate noted\par Will increase to 15 mg qweek\par

## 2021-06-18 NOTE — ASSESSMENT
[FreeTextEntry1] : 55 year old man returns for rheumatology follow up since starting methotrexate 10 mg qweek.  Patient with ankle pain and swelling, hand pain and previous history of olecranon bursitis, with MRI suggestive of inflammatory changes of the ankle. Rash does not appear consistent with psoriasis as per dermatology note, improved with topical steroids.  Patient with minimal improvement with nsaids, with some improvement since starting methotrexate 10 mg qweek, will increase to 15 mg qweek, increasing to 20 mg qweek as tolerated.will check labs today in office, Will follow up in four weeks weeks to repeat labs and continue to titrate dose as tolerated.

## 2021-06-18 NOTE — DATA REVIEWED
[FreeTextEntry1] : \par  XR FOOT COMPLETE 3 VIEWS BILATERAL             Final\par \par No Documents Attached\par \par \par \par \par   EXAM:  XR FOOT COMP MIN 3 VIEWS BI\par EXAM:  XR ANKLE COMP MIN 3 VIEWS BI\par \par PROCEDURE DATE:  03/17/2021\par \par \par \par \par INTERPRETATION:  CLINICAL HISTORY: 55-year-old with chronic bilateral foot and ankle pain.\par \par \par \par IMPRESSION: Frontal, lateral and oblique views of the left foot and left ankle demonstrates demonstrates plantar and Achilles tendon osteophytes. Hindfoot valgus. Pes planus. Tibiotalar joint is in appropriate alignment. TMT joint is in appropriate alignment. Appropriate osseous mineralization. No fracture. No dislocation. Large effusion intra-articular loose bodies of the tibiotalar joint.\par \par Frontal, lateral and oblique views of the right foot and right ankle demonstrates hindfoot valgus with pes planus. Plantar and Achilles tendon enthesophytes. Osteophytosis and intra-articular loose bodies of the tibiotalar joint. Tibiotalar joint is in appropriate alignment. Fragmented osteophytes of the dorsal aspect of the talonavicular joint. Plantar and Achilles tendon enthesophytes. Tibiotalar joint is in appropriate alignment.\par \par \par \par \par \par \par \par \par Dr. Macrina Thompson can be reached at [email or web address] with any questions regarding this report.\par \par \par \par \par \par BANDAR ROMO M.D., RADIOLOGY RESIDENT\par This document has been electronically signed.\par MACRINA THOMPSON MD; Attending Radiologist\par This document has been electronically signed. Mar 17 2021  1:56PM\par \par  \par \par  Ordered by: FARHAT CASTILLO       Collected/Examined: 17Mar2021 01:40PM       \par Verified by: FARHAT CASTILLO 18Mar2021 07:35AM       \par  Result Communication: No patient communication needed at this time;\par Stage: Final       \par  Performed at: [facility]       Resulted: 17Mar2021 01:53PM       Last Updated: 18Mar2021 07:35AM       Accession: [id number]       \par \par \par  XR ANKLE COMPLETE 3 VIEWS BILATERAL             Final\par \par No Documents Attached\par \par \par \par \par   EXAM:  XR FOOT COMP MIN 3 VIEWS BI\par EXAM:  XR ANKLE COMP MIN 3 VIEWS BI\par \par PROCEDURE DATE:  03/17/2021\par \par \par \par \par INTERPRETATION:  CLINICAL HISTORY: 55-year-old with chronic bilateral foot and ankle pain.\par \par \par \par IMPRESSION: Frontal, lateral and oblique views of the left foot and left ankle demonstrates demonstrates plantar and Achilles tendon osteophytes. Hindfoot valgus. Pes planus. Tibiotalar joint is in appropriate alignment. TMT joint is in appropriate alignment. Appropriate osseous mineralization. No fracture. No dislocation. Large effusion intra-articular loose bodies of the tibiotalar joint.\par \par Frontal, lateral and oblique views of the right foot and right ankle demonstrates hindfoot valgus with pes planus. Plantar and Achilles tendon enthesophytes. Osteophytosis and intra-articular loose bodies of the tibiotalar joint. Tibiotalar joint is in appropriate alignment. Fragmented osteophytes of the dorsal aspect of the talonavicular joint. Plantar and Achilles tendon enthesophytes. Tibiotalar joint is in appropriate alignment.\par \par \par \par \par \par \par \par \par Dr. Macrina Thompson can be reached at [email or web address] with any questions regarding this report.\par \par \par \par \par \par BANDAR ROMO M.D., RADIOLOGY RESIDENT\par This document has been electronically signed.\par MACRINA THOMPSON MD; Attending Radiologist\par This document has been electronically signed. Mar 17 2021  1:56PM\par \par  \par \par  Ordered by: FARHAT CASTILLO       Collected/Examined: 17Mar2021 01:40PM       \par Verified by: FARHAT CASTILLO 18Mar2021 07:35AM       \par  Result Communication: No patient communication needed at this time;\par Stage: Final       \par  Performed at: [facility]       Resulted: 17Mar2021 01:53PM       Last Updated: 18Mar2021 07:35AM       Accession: [id number]       \par \par MRI 3/29/2021\par right foot: \par Small erosion of the lateral side of the third metatarsal head, with some adjacent reactive marrow edema.  this may be a relevant finding in this patient with clinically suspected psoriatic arthropathy.  No other erosions are seen. Early nonspecific arthrosis of the fourth TMT joint. Subcortical marrow edema at the plantar aspect of the anterior talar articular plate may be medial reactive in nature or perhaps related to a minimal impaction injury. Small ganglion cyst at the dorsal/medial aspect of the first MPJ.\par

## 2021-06-29 ENCOUNTER — NON-APPOINTMENT (OUTPATIENT)
Age: 56
End: 2021-06-29

## 2021-07-13 ENCOUNTER — APPOINTMENT (OUTPATIENT)
Dept: RHEUMATOLOGY | Facility: CLINIC | Age: 56
End: 2021-07-13

## 2021-08-22 ENCOUNTER — RX RENEWAL (OUTPATIENT)
Age: 56
End: 2021-08-22

## 2021-08-23 ENCOUNTER — RX RENEWAL (OUTPATIENT)
Age: 56
End: 2021-08-23

## 2021-09-08 ENCOUNTER — APPOINTMENT (OUTPATIENT)
Dept: DERMATOLOGY | Facility: CLINIC | Age: 56
End: 2021-09-08

## 2021-09-13 ENCOUNTER — APPOINTMENT (OUTPATIENT)
Dept: HEART AND VASCULAR | Facility: CLINIC | Age: 56
End: 2021-09-13
Payer: COMMERCIAL

## 2021-09-13 VITALS
OXYGEN SATURATION: 96 % | SYSTOLIC BLOOD PRESSURE: 134 MMHG | WEIGHT: 213 LBS | BODY MASS INDEX: 39.2 KG/M2 | HEART RATE: 69 BPM | DIASTOLIC BLOOD PRESSURE: 82 MMHG | TEMPERATURE: 97.7 F | HEIGHT: 62 IN | RESPIRATION RATE: 16 BRPM

## 2021-09-13 PROCEDURE — 36415 COLL VENOUS BLD VENIPUNCTURE: CPT

## 2021-09-13 PROCEDURE — 99214 OFFICE O/P EST MOD 30 MIN: CPT

## 2021-09-13 PROCEDURE — 93000 ELECTROCARDIOGRAM COMPLETE: CPT

## 2021-09-13 NOTE — HISTORY OF PRESENT ILLNESS
[FreeTextEntry1] : 55 year male who comes for refills. He notes intermittent chest pain. Usually occurring at rest.

## 2021-09-13 NOTE — PHYSICAL EXAM
[Well Developed] : well developed [Normal Conjunctiva] : normal conjunctiva [Normal S1, S2] : normal S1, S2 [Clear Lung Fields] : clear lung fields [No Edema] : no edema [Moves all extremities] : moves all extremities [Normal] : alert and oriented, normal memory

## 2021-09-13 NOTE — ASSESSMENT
[FreeTextEntry1] : Atypical chest pain, psoriatic arthritis, hypertension, hyperlipidemia--Labs drawn and sent. Followup for Echo and stress Test

## 2021-09-14 LAB
ALBUMIN SERPL ELPH-MCNC: 4.8 G/DL
ALP BLD-CCNC: 73 U/L
ALT SERPL-CCNC: 34 U/L
ANION GAP SERPL CALC-SCNC: 13 MMOL/L
AST SERPL-CCNC: 24 U/L
BASOPHILS # BLD AUTO: 0.07 K/UL
BASOPHILS NFR BLD AUTO: 0.8 %
BILIRUB SERPL-MCNC: 0.4 MG/DL
BUN SERPL-MCNC: 15 MG/DL
CALCIUM SERPL-MCNC: 10.5 MG/DL
CHLORIDE SERPL-SCNC: 101 MMOL/L
CHOLEST SERPL-MCNC: 205 MG/DL
CO2 SERPL-SCNC: 27 MMOL/L
CREAT SERPL-MCNC: 0.9 MG/DL
EOSINOPHIL # BLD AUTO: 0.12 K/UL
EOSINOPHIL NFR BLD AUTO: 1.4 %
ESTIMATED AVERAGE GLUCOSE: 108 MG/DL
GLUCOSE SERPL-MCNC: 106 MG/DL
HBA1C MFR BLD HPLC: 5.4 %
HCT VFR BLD CALC: 45 %
HDLC SERPL-MCNC: 48 MG/DL
HGB BLD-MCNC: 14.7 G/DL
IMM GRANULOCYTES NFR BLD AUTO: 0.3 %
LDLC SERPL CALC-MCNC: 123 MG/DL
LYMPHOCYTES # BLD AUTO: 2.38 K/UL
LYMPHOCYTES NFR BLD AUTO: 26.9 %
MAN DIFF?: NORMAL
MCHC RBC-ENTMCNC: 31 PG
MCHC RBC-ENTMCNC: 32.7 GM/DL
MCV RBC AUTO: 94.9 FL
MONOCYTES # BLD AUTO: 0.82 K/UL
MONOCYTES NFR BLD AUTO: 9.3 %
NEUTROPHILS # BLD AUTO: 5.43 K/UL
NEUTROPHILS NFR BLD AUTO: 61.3 %
NONHDLC SERPL-MCNC: 157 MG/DL
PLATELET # BLD AUTO: 224 K/UL
POTASSIUM SERPL-SCNC: 4.2 MMOL/L
PROT SERPL-MCNC: 8.4 G/DL
RBC # BLD: 4.74 M/UL
RBC # FLD: 12.1 %
SODIUM SERPL-SCNC: 141 MMOL/L
TRIGL SERPL-MCNC: 170 MG/DL
TSH SERPL-ACNC: 1.97 UIU/ML
WBC # FLD AUTO: 8.85 K/UL

## 2021-10-13 ENCOUNTER — APPOINTMENT (OUTPATIENT)
Dept: HEART AND VASCULAR | Facility: CLINIC | Age: 56
End: 2021-10-13
Payer: COMMERCIAL

## 2021-10-13 VITALS
WEIGHT: 213 LBS | BODY MASS INDEX: 39.2 KG/M2 | RESPIRATION RATE: 16 BRPM | DIASTOLIC BLOOD PRESSURE: 90 MMHG | HEART RATE: 67 BPM | SYSTOLIC BLOOD PRESSURE: 146 MMHG | HEIGHT: 62 IN | TEMPERATURE: 97.6 F | OXYGEN SATURATION: 96 %

## 2021-10-13 DIAGNOSIS — R07.9 CHEST PAIN, UNSPECIFIED: ICD-10-CM

## 2021-10-13 PROCEDURE — 93015 CV STRESS TEST SUPVJ I&R: CPT

## 2021-10-13 NOTE — ASSESSMENT
[FreeTextEntry1] : At the time of the patient's visit a Stress Test was performed to evaluate for exercise induced arrhythmia and ischemia. At the time of the visit the results were reviewed with patient \par \par I asked him to return for an Echocardiogram

## 2021-11-04 ENCOUNTER — APPOINTMENT (OUTPATIENT)
Dept: HEART AND VASCULAR | Facility: CLINIC | Age: 56
End: 2021-11-04

## 2022-02-17 ENCOUNTER — RX RENEWAL (OUTPATIENT)
Age: 57
End: 2022-02-17

## 2022-05-10 ENCOUNTER — RX RENEWAL (OUTPATIENT)
Age: 57
End: 2022-05-10

## 2022-05-24 ENCOUNTER — APPOINTMENT (OUTPATIENT)
Dept: RHEUMATOLOGY | Facility: CLINIC | Age: 57
End: 2022-05-24

## 2022-06-06 ENCOUNTER — TRANSCRIPTION ENCOUNTER (OUTPATIENT)
Age: 57
End: 2022-06-06

## 2022-06-07 ENCOUNTER — TRANSCRIPTION ENCOUNTER (OUTPATIENT)
Age: 57
End: 2022-06-07

## 2022-06-15 ENCOUNTER — APPOINTMENT (OUTPATIENT)
Dept: ORTHOPEDIC SURGERY | Facility: CLINIC | Age: 57
End: 2022-06-15
Payer: COMMERCIAL

## 2022-06-15 VITALS — WEIGHT: 213 LBS | HEIGHT: 62 IN | RESPIRATION RATE: 16 BRPM | BODY MASS INDEX: 39.2 KG/M2

## 2022-06-15 DIAGNOSIS — M65.28 CALCIFIC TENDINITIS, OTHER SITE: ICD-10-CM

## 2022-06-15 DIAGNOSIS — M21.6X1 OTHER ACQUIRED DEFORMITIES OF RIGHT FOOT: ICD-10-CM

## 2022-06-15 PROCEDURE — 99214 OFFICE O/P EST MOD 30 MIN: CPT

## 2022-06-15 PROCEDURE — 73630 X-RAY EXAM OF FOOT: CPT | Mod: RT

## 2022-06-15 PROCEDURE — 73610 X-RAY EXAM OF ANKLE: CPT | Mod: RT

## 2022-06-15 RX ORDER — MELOXICAM 15 MG/1
15 TABLET ORAL
Qty: 30 | Refills: 5 | Status: ACTIVE | COMMUNITY
Start: 2021-04-19 | End: 1900-01-01

## 2022-06-16 ENCOUNTER — NON-APPOINTMENT (OUTPATIENT)
Age: 57
End: 2022-06-16

## 2022-06-20 ENCOUNTER — APPOINTMENT (OUTPATIENT)
Dept: RHEUMATOLOGY | Facility: CLINIC | Age: 57
End: 2022-06-20
Payer: COMMERCIAL

## 2022-06-20 VITALS
HEART RATE: 87 BPM | BODY MASS INDEX: 39.2 KG/M2 | WEIGHT: 213 LBS | DIASTOLIC BLOOD PRESSURE: 95 MMHG | SYSTOLIC BLOOD PRESSURE: 143 MMHG | HEIGHT: 62 IN | TEMPERATURE: 97.6 F | OXYGEN SATURATION: 98 %

## 2022-06-20 PROCEDURE — 99214 OFFICE O/P EST MOD 30 MIN: CPT | Mod: 25

## 2022-06-20 PROCEDURE — 36415 COLL VENOUS BLD VENIPUNCTURE: CPT

## 2022-06-20 NOTE — DATA REVIEWED
[FreeTextEntry1] : \par  XR FOOT COMPLETE 3 VIEWS BILATERAL             Final\par \par No Documents Attached\par \par \par \par \par   EXAM:  XR FOOT COMP MIN 3 VIEWS BI\par EXAM:  XR ANKLE COMP MIN 3 VIEWS BI\par \par PROCEDURE DATE:  03/17/2021\par \par \par \par \par INTERPRETATION:  CLINICAL HISTORY: 55-year-old with chronic bilateral foot and ankle pain.\par \par \par \par IMPRESSION: Frontal, lateral and oblique views of the left foot and left ankle demonstrates demonstrates plantar and Achilles tendon osteophytes. Hindfoot valgus. Pes planus. Tibiotalar joint is in appropriate alignment. TMT joint is in appropriate alignment. Appropriate osseous mineralization. No fracture. No dislocation. Large effusion intra-articular loose bodies of the tibiotalar joint.\par \par Frontal, lateral and oblique views of the right foot and right ankle demonstrates hindfoot valgus with pes planus. Plantar and Achilles tendon enthesophytes. Osteophytosis and intra-articular loose bodies of the tibiotalar joint. Tibiotalar joint is in appropriate alignment. Fragmented osteophytes of the dorsal aspect of the talonavicular joint. Plantar and Achilles tendon enthesophytes. Tibiotalar joint is in appropriate alignment.\par \par \par \par \par \par \par \par \par Dr. Macrina Thompson can be reached at [email or web address] with any questions regarding this report.\par \par \par \par \par \par BANDAR ROMO M.D., RADIOLOGY RESIDENT\par This document has been electronically signed.\par MACRINA THOMPSON MD; Attending Radiologist\par This document has been electronically signed. Mar 17 2021  1:56PM\par \par  \par \par  Ordered by: FARHAT CASTILLO       Collected/Examined: 17Mar2021 01:40PM       \par Verified by: FARHAT CASTILLO 18Mar2021 07:35AM       \par  Result Communication: No patient communication needed at this time;\par Stage: Final       \par  Performed at: [facility]       Resulted: 17Mar2021 01:53PM       Last Updated: 18Mar2021 07:35AM       Accession: [id number]       \par \par \par  XR ANKLE COMPLETE 3 VIEWS BILATERAL             Final\par \par No Documents Attached\par \par \par \par \par   EXAM:  XR FOOT COMP MIN 3 VIEWS BI\par EXAM:  XR ANKLE COMP MIN 3 VIEWS BI\par \par PROCEDURE DATE:  03/17/2021\par \par \par \par \par INTERPRETATION:  CLINICAL HISTORY: 55-year-old with chronic bilateral foot and ankle pain.\par \par \par \par IMPRESSION: Frontal, lateral and oblique views of the left foot and left ankle demonstrates demonstrates plantar and Achilles tendon osteophytes. Hindfoot valgus. Pes planus. Tibiotalar joint is in appropriate alignment. TMT joint is in appropriate alignment. Appropriate osseous mineralization. No fracture. No dislocation. Large effusion intra-articular loose bodies of the tibiotalar joint.\par \par Frontal, lateral and oblique views of the right foot and right ankle demonstrates hindfoot valgus with pes planus. Plantar and Achilles tendon enthesophytes. Osteophytosis and intra-articular loose bodies of the tibiotalar joint. Tibiotalar joint is in appropriate alignment. Fragmented osteophytes of the dorsal aspect of the talonavicular joint. Plantar and Achilles tendon enthesophytes. Tibiotalar joint is in appropriate alignment.\par \par \par \par \par \par \par \par \par Dr. Macrina Thompson can be reached at [email or web address] with any questions regarding this report.\par \par \par \par \par \par BANDAR ROMO M.D., RADIOLOGY RESIDENT\par This document has been electronically signed.\par MACRINA THOMPSON MD; Attending Radiologist\par This document has been electronically signed. Mar 17 2021  1:56PM\par \par  \par \par  Ordered by: FARHAT CASTILLO       Collected/Examined: 17Mar2021 01:40PM       \par Verified by: FARHAT CASTILLO 18Mar2021 07:35AM       \par  Result Communication: No patient communication needed at this time;\par Stage: Final       \par  Performed at: [facility]       Resulted: 17Mar2021 01:53PM       Last Updated: 18Mar2021 07:35AM       Accession: [id number]       \par \par MRI 3/29/2021\par right foot: \par Small erosion of the lateral side of the third metatarsal head, with some adjacent reactive marrow edema.  this may be a relevant finding in this patient with clinically suspected psoriatic arthropathy.  No other erosions are seen. Early nonspecific arthrosis of the fourth TMT joint. Subcortical marrow edema at the plantar aspect of the anterior talar articular plate may be medial reactive in nature or perhaps related to a minimal impaction injury. Small ganglion cyst at the dorsal/medial aspect of the first MPJ.\par

## 2022-06-20 NOTE — ASSESSMENT
[FreeTextEntry1] : 56 year old man returns for rheumatology follow up, with increasing joint pain and stiffness.  Patient previously treated with methotrexate 15 mg weekly with good response in terms of joint pain and swelling.  Will update labs today in the office including CBC, CMP, ESR, and CRP.  Will restart methotrexate 10 mg/week with folic acid 1 mg daily.  Again reviewed risks and benefits of methotrexate, will avoid alcohol consumption while taking methotrexate. patient also started on meloxicam by orthopedics to take as needed for pain.  Previous MRI suggestive of inflammatory changes of the ankle.  Will return in 3 to 4 weeks with goal of continuing to titrate methotrexate as tolerated.  \par \par

## 2022-06-20 NOTE — PHYSICAL EXAM
[General Appearance - Alert] : alert [General Appearance - In No Acute Distress] : in no acute distress [General Appearance - Well Nourished] : well nourished [General Appearance - Well Developed] : well developed [General Appearance - Well-Appearing] : healthy appearing [Sclera] : the sclera and conjunctiva were normal [Examination Of The Oral Cavity] : the lips and gums were normal [Oropharynx] : the oropharynx was normal [] : no respiratory distress [Respiration, Rhythm And Depth] : normal respiratory rhythm and effort [Exaggerated Use Of Accessory Muscles For Inspiration] : no accessory muscle use [Abnormal Walk] : normal gait [Oriented To Time, Place, And Person] : oriented to person, place, and time [Impaired Insight] : insight and judgment were intact [Affect] : the affect was normal [Mood] : the mood was normal [FreeTextEntry1] : minimal right wrist effusion, with decrease in flexion and extension.  Tenderness over the base of the left fifth MTP on the plantar surface of the foot.  Right ankle with minimal edema

## 2022-06-20 NOTE — HISTORY OF PRESENT ILLNESS
[FreeTextEntry1] : April 5, 2021\par 55 year old man referred for rheumatology evaluation. 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 possible arthritis\par Pain in the right foot and ankle as well\par Feels changes in the toenails\par \par One year ago, joint pains started\par Takes advil for the pain, sometimes takes two advil, which helps a little bit\par History of olecranon bursitis, evaluated one year ago by orthopedist for right olecranon bursitis, most recently with left olecranon bursa symptoms \par No previous cortisone injections in the hands or elbows previously treated with OT, had cortisone injection in the  in the right knee more than five years ago, which help\par \par no smoking, social beer\par \par Also with rash on the body, hyperpigmentation with annular lesions, +pruritic, no scale noted.\par No previous dermatology evaluation, will refer to dermatology for evaluation of possible psoriasis.\par \par April 19, 2021\par Patient returns for follow up \par Reviewed labs with patient\par Blood pressure initially elevated, repeat blood pressure better \par Pain persists in the right wrist and hand\par No medications for pain at this time.\par Patient has appointment with orthopedist tomorrow to evaluate for bilateral knee pain and dermatologist on Wednesday\par Rashes persist on the upper back associated with pruritus\par \par May 13, 2021\par Patient returns to review results and discuss treatment options.  \par Discussed dermatology evaluation.  Rash improved with topical steroids.\par Trial of MTX discussed, minimal improvement with meloxicam\par Discussed risks and benefits of MTX with patient.\par \par Iraida 15, 2021\par Patient returns for follow up\par Feeling better since starting methotrexate but still with some pain and stiffness\par No side effects from methotrexate noted\par Will increase to 15 mg qweek\par \par June 20, 2022\par Patient returns for follow-up, last visit Iraida 15, 2021\par At this time, pain in the right ankle, Left 5th MTP, and right wrist\par Previously treated with methotrexate 10 mg/week, increase to 15 mg/week in June 2021.  Has not taken for more than 3 months.  Previously felt benefit with methotrexate.\par Was seen by orthopedics Iraida 15 started on meloxicam as needed for pain\par Blood pressure elevated today, patient reports has not picked up one of his antihypertensive medications from the pharmacy, will restart today.

## 2022-06-21 LAB
ALBUMIN SERPL ELPH-MCNC: 5.1 G/DL
ALP BLD-CCNC: 81 U/L
ALT SERPL-CCNC: 22 U/L
ANION GAP SERPL CALC-SCNC: 11 MMOL/L
AST SERPL-CCNC: 23 U/L
BASOPHILS # BLD AUTO: 0.04 K/UL
BASOPHILS NFR BLD AUTO: 0.4 %
BILIRUB SERPL-MCNC: 0.2 MG/DL
BUN SERPL-MCNC: 25 MG/DL
CALCIUM SERPL-MCNC: 9.9 MG/DL
CHLORIDE SERPL-SCNC: 102 MMOL/L
CO2 SERPL-SCNC: 25 MMOL/L
CREAT SERPL-MCNC: 0.92 MG/DL
CRP SERPL-MCNC: 8 MG/L
EGFR: 97 ML/MIN/1.73M2
EOSINOPHIL # BLD AUTO: 0.23 K/UL
EOSINOPHIL NFR BLD AUTO: 2.5 %
ERYTHROCYTE [SEDIMENTATION RATE] IN BLOOD BY WESTERGREN METHOD: 64 MM/HR
GLUCOSE SERPL-MCNC: 92 MG/DL
HCT VFR BLD CALC: 44.3 %
HGB BLD-MCNC: 15.1 G/DL
IMM GRANULOCYTES NFR BLD AUTO: 0.3 %
LYMPHOCYTES # BLD AUTO: 1.9 K/UL
LYMPHOCYTES NFR BLD AUTO: 20.3 %
MAN DIFF?: NORMAL
MCHC RBC-ENTMCNC: 31.4 PG
MCHC RBC-ENTMCNC: 34.1 GM/DL
MCV RBC AUTO: 92.1 FL
MONOCYTES # BLD AUTO: 0.74 K/UL
MONOCYTES NFR BLD AUTO: 7.9 %
NEUTROPHILS # BLD AUTO: 6.42 K/UL
NEUTROPHILS NFR BLD AUTO: 68.6 %
PLATELET # BLD AUTO: 283 K/UL
POTASSIUM SERPL-SCNC: 4.1 MMOL/L
PROT SERPL-MCNC: 8.8 G/DL
RBC # BLD: 4.81 M/UL
RBC # FLD: 12.1 %
SODIUM SERPL-SCNC: 138 MMOL/L
WBC # FLD AUTO: 9.36 K/UL

## 2022-06-23 ENCOUNTER — RX RENEWAL (OUTPATIENT)
Age: 57
End: 2022-06-23

## 2022-07-18 ENCOUNTER — APPOINTMENT (OUTPATIENT)
Dept: RHEUMATOLOGY | Facility: CLINIC | Age: 57
End: 2022-07-18

## 2022-07-18 ENCOUNTER — NON-APPOINTMENT (OUTPATIENT)
Age: 57
End: 2022-07-18

## 2022-07-18 VITALS
HEART RATE: 77 BPM | SYSTOLIC BLOOD PRESSURE: 147 MMHG | HEIGHT: 68 IN | OXYGEN SATURATION: 98 % | DIASTOLIC BLOOD PRESSURE: 93 MMHG | WEIGHT: 204 LBS | BODY MASS INDEX: 30.92 KG/M2 | TEMPERATURE: 98.2 F

## 2022-07-18 VITALS — SYSTOLIC BLOOD PRESSURE: 133 MMHG | DIASTOLIC BLOOD PRESSURE: 78 MMHG

## 2022-07-18 PROCEDURE — 99213 OFFICE O/P EST LOW 20 MIN: CPT | Mod: 25

## 2022-07-18 PROCEDURE — 36415 COLL VENOUS BLD VENIPUNCTURE: CPT

## 2022-07-18 NOTE — PHYSICAL EXAM
[General Appearance - Alert] : alert [General Appearance - In No Acute Distress] : in no acute distress [General Appearance - Well Nourished] : well nourished [General Appearance - Well Developed] : well developed [General Appearance - Well-Appearing] : healthy appearing [Sclera] : the sclera and conjunctiva were normal [Respiration, Rhythm And Depth] : normal respiratory rhythm and effort [Exaggerated Use Of Accessory Muscles For Inspiration] : no accessory muscle use [Edema] : there was no peripheral edema [Abnormal Walk] : normal gait [] : no rash [Oriented To Time, Place, And Person] : oriented to person, place, and time [Impaired Insight] : insight and judgment were intact [Affect] : the affect was normal [Mood] : the mood was normal [FreeTextEntry1] : right wrist with small effusion, nontender, improved range of motion.  tenderness over the left 5th MTP as well.

## 2022-07-18 NOTE — HISTORY OF PRESENT ILLNESS
[FreeTextEntry1] : April 5, 2021\par 55 year old man referred for rheumatology evaluation. 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 possible arthritis\par Pain in the right foot and ankle as well\par Feels changes in the toenails\par \par One year ago, joint pains started\par Takes advil for the pain, sometimes takes two advil, which helps a little bit\par History of olecranon bursitis, evaluated one year ago by orthopedist for right olecranon bursitis, most recently with left olecranon bursa symptoms \par No previous cortisone injections in the hands or elbows previously treated with OT, had cortisone injection in the  in the right knee more than five years ago, which help\par \par no smoking, social beer\par \par Also with rash on the body, hyperpigmentation with annular lesions, +pruritic, no scale noted.\par No previous dermatology evaluation, will refer to dermatology for evaluation of possible psoriasis.\par \par April 19, 2021\par Patient returns for follow up \par Reviewed labs with patient\par Blood pressure initially elevated, repeat blood pressure better \par Pain persists in the right wrist and hand\par No medications for pain at this time.\par Patient has appointment with orthopedist tomorrow to evaluate for bilateral knee pain and dermatologist on Wednesday\par Rashes persist on the upper back associated with pruritus\par \par May 13, 2021\par Patient returns to review results and discuss treatment options.  \par Discussed dermatology evaluation.  Rash improved with topical steroids.\par Trial of MTX discussed, minimal improvement with meloxicam\par Discussed risks and benefits of MTX with patient.\par \par Iraida 15, 2021\par Patient returns for follow up\par Feeling better since starting methotrexate but still with some pain and stiffness\par No side effects from methotrexate noted\par Will increase to 15 mg qweek\par \par June 20, 2022\par Patient returns for follow-up, last visit Iraida 15, 2021\par At this time, pain in the right ankle, Left 5th MTP, and right wrist\par Previously treated with methotrexate 10 mg/week, increase to 15 mg/week in June 2021.  Has not taken for more than 3 months.  Previously felt benefit with methotrexate.\par Was seen by orthopedics Iraida 15 started on meloxicam as needed for pain\par Blood pressure elevated today, patient reports has not picked up one of his antihypertensive medications from the pharmacy, will restart today.\par \par July 18, 2022\par Patient returns for follow-up\par Last visit has been taking methotrexate 10 mg/week on Thursdays, feels improvement in right wrist pain since that time, no side effects with methotrexate, can use folic acid 1 mg/day as well\par Still with pain in the foot will see Dr. Hernandez in 2 weeks\par Taking meloxicam with benefit as well\par Blood pressure elevated today, has been taking all of his blood pressure medicines, will make appointment with primary care provider as well, blood pressure 133/78 on repeat check\par

## 2022-07-18 NOTE — DATA REVIEWED
[FreeTextEntry1] : April 2021\par Rheumatoid factor negative\par CCP negative\par HLA-B27 negative\par \par June 2022\par ESR 54\par CRP 8\par \par \par  XR FOOT COMPLETE 3 VIEWS BILATERAL             Final\par \par No Documents Attached\par \par \par \par \par   EXAM:  XR FOOT COMP MIN 3 VIEWS BI\par EXAM:  XR ANKLE COMP MIN 3 VIEWS BI\par \par PROCEDURE DATE:  03/17/2021\par \par \par \par \par INTERPRETATION:  CLINICAL HISTORY: 55-year-old with chronic bilateral foot and ankle pain.\par \par \par \par IMPRESSION: Frontal, lateral and oblique views of the left foot and left ankle demonstrates demonstrates plantar and Achilles tendon osteophytes. Hindfoot valgus. Pes planus. Tibiotalar joint is in appropriate alignment. TMT joint is in appropriate alignment. Appropriate osseous mineralization. No fracture. No dislocation. Large effusion intra-articular loose bodies of the tibiotalar joint.\par \par Frontal, lateral and oblique views of the right foot and right ankle demonstrates hindfoot valgus with pes planus. Plantar and Achilles tendon enthesophytes. Osteophytosis and intra-articular loose bodies of the tibiotalar joint. Tibiotalar joint is in appropriate alignment. Fragmented osteophytes of the dorsal aspect of the talonavicular joint. Plantar and Achilles tendon enthesophytes. Tibiotalar joint is in appropriate alignment.\par \par \par \par \par \par \par \par \par Dr. Macrina Thompson can be reached at [email or web address] with any questions regarding this report.\par \par \par \par \par \par BANDAR ROMO M.D., RADIOLOGY RESIDENT\par This document has been electronically signed.\par MACRINA THOMPSON MD; Attending Radiologist\par This document has been electronically signed. Mar 17 2021  1:56PM\par \par  \par \par  Ordered by: FARHAT CASTILLO       Collected/Examined: 17Mar2021 01:40PM       \par Verified by: FARHAT CASTILLO 18Mar2021 07:35AM       \par  Result Communication: No patient communication needed at this time;\par Stage: Final       \par  Performed at: [facility]       Resulted: 17Mar2021 01:53PM       Last Updated: 18Mar2021 07:35AM       Accession: [id number]       \par \par \par  XR ANKLE COMPLETE 3 VIEWS BILATERAL             Final\par \par No Documents Attached\par \par \par \par \par   EXAM:  XR FOOT COMP MIN 3 VIEWS BI\par EXAM:  XR ANKLE COMP MIN 3 VIEWS BI\par \par PROCEDURE DATE:  03/17/2021\par \par \par \par \par INTERPRETATION:  CLINICAL HISTORY: 55-year-old with chronic bilateral foot and ankle pain.\par \par \par \par IMPRESSION: Frontal, lateral and oblique views of the left foot and left ankle demonstrates demonstrates plantar and Achilles tendon osteophytes. Hindfoot valgus. Pes planus. Tibiotalar joint is in appropriate alignment. TMT joint is in appropriate alignment. Appropriate osseous mineralization. No fracture. No dislocation. Large effusion intra-articular loose bodies of the tibiotalar joint.\par \par Frontal, lateral and oblique views of the right foot and right ankle demonstrates hindfoot valgus with pes planus. Plantar and Achilles tendon enthesophytes. Osteophytosis and intra-articular loose bodies of the tibiotalar joint. Tibiotalar joint is in appropriate alignment. Fragmented osteophytes of the dorsal aspect of the talonavicular joint. Plantar and Achilles tendon enthesophytes. Tibiotalar joint is in appropriate alignment.\par \par \par \par \par \par \par \par \par Dr. Macrina Thompson can be reached at [email or web address] with any questions regarding this report.\par \par \par \par \par \par BANDAR ROMO M.D., RADIOLOGY RESIDENT\par This document has been electronically signed.\par MACRINA THOMPSON MD; Attending Radiologist\par This document has been electronically signed. Mar 17 2021  1:56PM\par \par  \par \par  Ordered by: FARHAT CASTILLO       Collected/Examined: 17Mar2021 01:40PM       \par Verified by: FARHAT CASTILLO 18Mar2021 07:35AM       \par  Result Communication: No patient communication needed at this time;\par Stage: Final       \par  Performed at: [facility]       Resulted: 17Mar2021 01:53PM       Last Updated: 18Mar2021 07:35AM       Accession: [id number]       \par \par MRI 3/29/2021\par right foot: \par Small erosion of the lateral side of the third metatarsal head, with some adjacent reactive marrow edema.  this may be a relevant finding in this patient with clinically suspected psoriatic arthropathy.  No other erosions are seen. Early nonspecific arthrosis of the fourth TMT joint. Subcortical marrow edema at the plantar aspect of the anterior talar articular plate may be medial reactive in nature or perhaps related to a minimal impaction injury. Small ganglion cyst at the dorsal/medial aspect of the first MPJ.\par

## 2022-07-18 NOTE — ASSESSMENT
[FreeTextEntry1] : 56 year old man returns for rheumatology follow up, patient started on methotrexate 10 mg/week at last visit with folic acid 1 mg/day, feeling better in terms of joint pain and stiffness since last visit.  Patient previously treated with methotrexate 15 mg weekly with good response in terms of joint pain and swelling.  Will update labs today in the office including CBC, CMP, ESR, and CRP and will increase methotrexate to 15 mg/week with folic acid 1 mg daily.  Again reviewed risks and benefits of methotrexate, will avoid alcohol consumption while taking methotrexate. patient also started on meloxicam by orthopedics to take as needed for pain.  Previous MRI suggestive of inflammatory changes of the ankle.  Repeat blood pressure in the normal range. Will return in 3 weeks with goal of continuing to titrate methotrexate as tolerated.  \par \par

## 2022-07-19 LAB
ALBUMIN SERPL ELPH-MCNC: 5 G/DL
ALP BLD-CCNC: 79 U/L
ALT SERPL-CCNC: 29 U/L
ANION GAP SERPL CALC-SCNC: 17 MMOL/L
AST SERPL-CCNC: 27 U/L
BASOPHILS # BLD AUTO: 0.05 K/UL
BASOPHILS NFR BLD AUTO: 0.7 %
BILIRUB SERPL-MCNC: 0.5 MG/DL
BUN SERPL-MCNC: 19 MG/DL
CALCIUM SERPL-MCNC: 10.1 MG/DL
CHLORIDE SERPL-SCNC: 102 MMOL/L
CO2 SERPL-SCNC: 22 MMOL/L
CREAT SERPL-MCNC: 0.92 MG/DL
CRP SERPL-MCNC: <3 MG/L
EGFR: 97 ML/MIN/1.73M2
EOSINOPHIL # BLD AUTO: 0.24 K/UL
EOSINOPHIL NFR BLD AUTO: 3.3 %
ERYTHROCYTE [SEDIMENTATION RATE] IN BLOOD BY WESTERGREN METHOD: 31 MM/HR
GLUCOSE SERPL-MCNC: 86 MG/DL
HCT VFR BLD CALC: 43.3 %
HGB BLD-MCNC: 14 G/DL
IMM GRANULOCYTES NFR BLD AUTO: 0.1 %
LYMPHOCYTES # BLD AUTO: 1.58 K/UL
LYMPHOCYTES NFR BLD AUTO: 21.9 %
MAN DIFF?: NORMAL
MCHC RBC-ENTMCNC: 30.8 PG
MCHC RBC-ENTMCNC: 32.3 GM/DL
MCV RBC AUTO: 95.2 FL
MONOCYTES # BLD AUTO: 0.75 K/UL
MONOCYTES NFR BLD AUTO: 10.4 %
NEUTROPHILS # BLD AUTO: 4.57 K/UL
NEUTROPHILS NFR BLD AUTO: 63.6 %
PLATELET # BLD AUTO: 234 K/UL
POTASSIUM SERPL-SCNC: 4.7 MMOL/L
PROT SERPL-MCNC: 8.4 G/DL
RBC # BLD: 4.55 M/UL
RBC # FLD: 13.1 %
SODIUM SERPL-SCNC: 141 MMOL/L
WBC # FLD AUTO: 7.2 K/UL

## 2022-07-21 ENCOUNTER — RX RENEWAL (OUTPATIENT)
Age: 57
End: 2022-07-21

## 2022-07-26 ENCOUNTER — RX RENEWAL (OUTPATIENT)
Age: 57
End: 2022-07-26

## 2022-07-27 ENCOUNTER — APPOINTMENT (OUTPATIENT)
Dept: ORTHOPEDIC SURGERY | Facility: CLINIC | Age: 57
End: 2022-07-27

## 2022-08-09 ENCOUNTER — APPOINTMENT (OUTPATIENT)
Dept: RHEUMATOLOGY | Facility: CLINIC | Age: 57
End: 2022-08-09

## 2022-08-12 ENCOUNTER — RX RENEWAL (OUTPATIENT)
Age: 57
End: 2022-08-12

## 2022-08-15 ENCOUNTER — RX RENEWAL (OUTPATIENT)
Age: 57
End: 2022-08-15

## 2022-09-22 ENCOUNTER — APPOINTMENT (OUTPATIENT)
Dept: RHEUMATOLOGY | Facility: CLINIC | Age: 57
End: 2022-09-22

## 2022-09-22 ENCOUNTER — RX RENEWAL (OUTPATIENT)
Age: 57
End: 2022-09-22

## 2022-09-22 VITALS
SYSTOLIC BLOOD PRESSURE: 117 MMHG | WEIGHT: 206 LBS | HEART RATE: 65 BPM | DIASTOLIC BLOOD PRESSURE: 78 MMHG | OXYGEN SATURATION: 98 % | TEMPERATURE: 97.7 F | HEIGHT: 68 IN | BODY MASS INDEX: 31.22 KG/M2

## 2022-09-22 DIAGNOSIS — M06.4 INFLAMMATORY POLYARTHROPATHY: ICD-10-CM

## 2022-09-22 PROCEDURE — 36415 COLL VENOUS BLD VENIPUNCTURE: CPT

## 2022-09-22 PROCEDURE — 99214 OFFICE O/P EST MOD 30 MIN: CPT | Mod: 25

## 2022-09-22 RX ORDER — METHOTREXATE 2.5 MG/1
2.5 TABLET ORAL
Qty: 72 | Refills: 1 | Status: ACTIVE | COMMUNITY
Start: 2021-05-13 | End: 1900-01-01

## 2022-09-22 NOTE — HISTORY OF PRESENT ILLNESS
[FreeTextEntry1] : April 5, 2021\par 55 year old man referred for rheumatology evaluation. 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 possible arthritis\par Pain in the right foot and ankle as well\par Feels changes in the toenails\par \par One year ago, joint pains started\par Takes advil for the pain, sometimes takes two advil, which helps a little bit\par History of olecranon bursitis, evaluated one year ago by orthopedist for right olecranon bursitis, most recently with left olecranon bursa symptoms \par No previous cortisone injections in the hands or elbows previously treated with OT, had cortisone injection in the  in the right knee more than five years ago, which help\par \par no smoking, social beer\par \par Also with rash on the body, hyperpigmentation with annular lesions, +pruritic, no scale noted.\par No previous dermatology evaluation, will refer to dermatology for evaluation of possible psoriasis.\par \par April 19, 2021\par Patient returns for follow up \par Reviewed labs with patient\par Blood pressure initially elevated, repeat blood pressure better \par Pain persists in the right wrist and hand\par No medications for pain at this time.\par Patient has appointment with orthopedist tomorrow to evaluate for bilateral knee pain and dermatologist on Wednesday\par Rashes persist on the upper back associated with pruritus\par \par May 13, 2021\par Patient returns to review results and discuss treatment options.  \par Discussed dermatology evaluation.  Rash improved with topical steroids.\par Trial of MTX discussed, minimal improvement with meloxicam\par Discussed risks and benefits of MTX with patient.\par \par Iraida 15, 2021\par Patient returns for follow up\par Feeling better since starting methotrexate but still with some pain and stiffness\par No side effects from methotrexate noted\par Will increase to 15 mg qweek\par \par June 20, 2022\par Patient returns for follow-up, last visit Iraida 15, 2021\par At this time, pain in the right ankle, Left 5th MTP, and right wrist\par Previously treated with methotrexate 10 mg/week, increase to 15 mg/week in June 2021.  Has not taken for more than 3 months.  Previously felt benefit with methotrexate.\par Was seen by orthopedics Ira[place] 15 started on meloxicam as needed for pain\par Blood pressure elevated today, patient reports has not picked up one of his antihypertensive medications from the pharmacy, will restart today.\par \par July 18, 2022\par Patient returns for follow-up\par Last visit has been taking methotrexate 10 mg/week on Thursdays, feels improvement in right wrist pain since that time, no side effects with methotrexate, can use folic acid 1 mg/day as well\par Still with pain in the foot will see Dr. Hernandez in 2 weeks\par Taking meloxicam with benefit as well\par Blood pressure elevated today, has been taking all of his blood pressure medicines, will make appointment with primary care provider as well, blood pressure 133/78 on repeat check\par \par September 22, 2022\par Patient returns for follow-up\par Patient feeling much better, joint pains improved\par No side effects from methotrexate currently taking 15 mg weekly due for dose today\par Continues folic acid 1 mg daily\par Right wrist pain feels better, feet improving\par Feels likely improved with correction as well\par Increased activity levels.\par Blood pressure well controlled today.

## 2022-09-22 NOTE — PHYSICAL EXAM
[General Appearance - Alert] : alert [General Appearance - In No Acute Distress] : in no acute distress [General Appearance - Well-Appearing] : healthy appearing [Sclera] : the sclera and conjunctiva were normal [] : no respiratory distress [Exaggerated Use Of Accessory Muscles For Inspiration] : no accessory muscle use [Abnormal Walk] : normal gait [Oriented To Time, Place, And Person] : oriented to person, place, and time [Impaired Insight] : insight and judgment were intact [Affect] : the affect was normal [FreeTextEntry1] : right wrist improved, no effusion, nontender, improved range of motion.  No MTP tenderness.

## 2022-09-22 NOTE — DATA REVIEWED
[FreeTextEntry1] : April 2021\par Rheumatoid factor negative\par CCP negative\par HLA-B27 negative\par \par June 2022\par ESR 54\par CRP 8\par \par \par  XR FOOT COMPLETE 3 VIEWS BILATERAL             Final\par \par No Documents Attached\par \par \par \par \par   EXAM:  XR FOOT COMP MIN 3 VIEWS BI\par EXAM:  XR ANKLE COMP MIN 3 VIEWS BI\par \par PROCEDURE DATE:  03/17/2021\par \par \par \par \par INTERPRETATION:  CLINICAL HISTORY: 55-year-old with chronic bilateral foot and ankle pain.\par \par \par \par IMPRESSION: Frontal, lateral and oblique views of the left foot and left ankle demonstrates demonstrates plantar and Achilles tendon osteophytes. Hindfoot valgus. Pes planus. Tibiotalar joint is in appropriate alignment. TMT joint is in appropriate alignment. Appropriate osseous mineralization. No fracture. No dislocation. Large effusion intra-articular loose bodies of the tibiotalar joint.\par \par Frontal, lateral and oblique views of the right foot and right ankle demonstrates hindfoot valgus with pes planus. Plantar and Achilles tendon enthesophytes. Osteophytosis and intra-articular loose bodies of the tibiotalar joint. Tibiotalar joint is in appropriate alignment. Fragmented osteophytes of the dorsal aspect of the talonavicular joint. Plantar and Achilles tendon enthesophytes. Tibiotalar joint is in appropriate alignment.\par \par \par \par \par \par \par \par \par Dr. Macrina Thompson can be reached at [email or web address] with any questions regarding this report.\par \par \par \par \par \par BANDAR ROMO M.D., RADIOLOGY RESIDENT\par This document has been electronically signed.\par MACRINA THOMPSON MD; Attending Radiologist\par This document has been electronically signed. Mar 17 2021  1:56PM\par \par  \par \par  Ordered by: FARHAT CASTILLO       Collected/Examined: 17Mar2021 01:40PM       \par Verified by: FARHAT CASTILLO 18Mar2021 07:35AM       \par  Result Communication: No patient communication needed at this time;\par Stage: Final       \par  Performed at: [facility]       Resulted: 17Mar2021 01:53PM       Last Updated: 18Mar2021 07:35AM       Accession: [id number]       \par \par \par  XR ANKLE COMPLETE 3 VIEWS BILATERAL             Final\par \par No Documents Attached\par \par \par \par \par   EXAM:  XR FOOT COMP MIN 3 VIEWS BI\par EXAM:  XR ANKLE COMP MIN 3 VIEWS BI\par \par PROCEDURE DATE:  03/17/2021\par \par \par \par \par INTERPRETATION:  CLINICAL HISTORY: 55-year-old with chronic bilateral foot and ankle pain.\par \par \par \par IMPRESSION: Frontal, lateral and oblique views of the left foot and left ankle demonstrates demonstrates plantar and Achilles tendon osteophytes. Hindfoot valgus. Pes planus. Tibiotalar joint is in appropriate alignment. TMT joint is in appropriate alignment. Appropriate osseous mineralization. No fracture. No dislocation. Large effusion intra-articular loose bodies of the tibiotalar joint.\par \par Frontal, lateral and oblique views of the right foot and right ankle demonstrates hindfoot valgus with pes planus. Plantar and Achilles tendon enthesophytes. Osteophytosis and intra-articular loose bodies of the tibiotalar joint. Tibiotalar joint is in appropriate alignment. Fragmented osteophytes of the dorsal aspect of the talonavicular joint. Plantar and Achilles tendon enthesophytes. Tibiotalar joint is in appropriate alignment.\par \par \par \par \par \par \par \par \par Dr. Macrina Thompson can be reached at [email or web address] with any questions regarding this report.\par \par \par \par \par \par BANDAR ROMO M.D., RADIOLOGY RESIDENT\par This document has been electronically signed.\par MACRINA THOMPSON MD; Attending Radiologist\par This document has been electronically signed. Mar 17 2021  1:56PM\par \par  \par \par  Ordered by: FARHAT CASTILLO       Collected/Examined: 17Mar2021 01:40PM       \par Verified by: FARHAT CASTILLO 18Mar2021 07:35AM       \par  Result Communication: No patient communication needed at this time;\par Stage: Final       \par  Performed at: [facility]       Resulted: 17Mar2021 01:53PM       Last Updated: 18Mar2021 07:35AM       Accession: [id number]       \par \par MRI 3/29/2021\par right foot: \par Small erosion of the lateral side of the third metatarsal head, with some adjacent reactive marrow edema.  this may be a relevant finding in this patient with clinically suspected psoriatic arthropathy.  No other erosions are seen. Early nonspecific arthrosis of the fourth TMT joint. Subcortical marrow edema at the plantar aspect of the anterior talar articular plate may be medial reactive in nature or perhaps related to a minimal impaction injury. Small ganglion cyst at the dorsal/medial aspect of the first MPJ.\par

## 2022-09-22 NOTE — ASSESSMENT
[FreeTextEntry1] : 56 year old man returns for rheumatology follow up, doing well on methotrexate 15 mg weekly with folic acid 1 mg/day.  Patient denies joint pain, joint stiffness, or swelling at this time. Will update labs today in the office including CBC, CMP, ESR, and CRP and will continue methotrexate 15 mg/week with folic acid 1 mg daily.  Again reviewed risks and benefits of methotrexate, will avoid alcohol consumption while taking methotrexate. Patient continues meloxicam by orthopedics to take as needed for pain. Previous MRI suggestive of inflammatory changes of the ankle. Blood pressure in the normal range.  Patient will be traveling to the [place], and will follow-up in 3 months when returns or sooner as needed.

## 2022-09-23 LAB
ALBUMIN SERPL ELPH-MCNC: 5 G/DL
ALP BLD-CCNC: 63 U/L
ALT SERPL-CCNC: 23 U/L
ANION GAP SERPL CALC-SCNC: 14 MMOL/L
AST SERPL-CCNC: 19 U/L
BASOPHILS # BLD AUTO: 0.05 K/UL
BASOPHILS NFR BLD AUTO: 0.7 %
BILIRUB SERPL-MCNC: 0.5 MG/DL
BUN SERPL-MCNC: 33 MG/DL
CALCIUM SERPL-MCNC: 10 MG/DL
CHLORIDE SERPL-SCNC: 101 MMOL/L
CO2 SERPL-SCNC: 24 MMOL/L
CREAT SERPL-MCNC: 1.15 MG/DL
CRP SERPL-MCNC: <3 MG/L
EGFR: 74 ML/MIN/1.73M2
EOSINOPHIL # BLD AUTO: 0.25 K/UL
EOSINOPHIL NFR BLD AUTO: 3.5 %
ERYTHROCYTE [SEDIMENTATION RATE] IN BLOOD BY WESTERGREN METHOD: 43 MM/HR
GLUCOSE SERPL-MCNC: 95 MG/DL
HCT VFR BLD CALC: 42.4 %
HGB BLD-MCNC: 14.2 G/DL
IMM GRANULOCYTES NFR BLD AUTO: 0.3 %
LYMPHOCYTES # BLD AUTO: 2.24 K/UL
LYMPHOCYTES NFR BLD AUTO: 31.3 %
MAN DIFF?: NORMAL
MCHC RBC-ENTMCNC: 31.9 PG
MCHC RBC-ENTMCNC: 33.5 GM/DL
MCV RBC AUTO: 95.3 FL
MONOCYTES # BLD AUTO: 0.78 K/UL
MONOCYTES NFR BLD AUTO: 10.9 %
NEUTROPHILS # BLD AUTO: 3.82 K/UL
NEUTROPHILS NFR BLD AUTO: 53.3 %
PLATELET # BLD AUTO: 229 K/UL
POTASSIUM SERPL-SCNC: 4.3 MMOL/L
PROT SERPL-MCNC: 8.3 G/DL
RBC # BLD: 4.45 M/UL
RBC # FLD: 12.3 %
SODIUM SERPL-SCNC: 139 MMOL/L
WBC # FLD AUTO: 7.16 K/UL

## 2022-11-03 ENCOUNTER — RX RENEWAL (OUTPATIENT)
Age: 57
End: 2022-11-03

## 2022-12-22 ENCOUNTER — RX RENEWAL (OUTPATIENT)
Age: 57
End: 2022-12-22

## 2023-01-11 ENCOUNTER — RX RENEWAL (OUTPATIENT)
Age: 58
End: 2023-01-11

## 2023-01-13 ENCOUNTER — RX RENEWAL (OUTPATIENT)
Age: 58
End: 2023-01-13

## 2023-01-13 ENCOUNTER — APPOINTMENT (OUTPATIENT)
Dept: HEART AND VASCULAR | Facility: CLINIC | Age: 58
End: 2023-01-13

## 2023-02-13 ENCOUNTER — RX RENEWAL (OUTPATIENT)
Age: 58
End: 2023-02-13

## 2023-03-13 ENCOUNTER — RX RENEWAL (OUTPATIENT)
Age: 58
End: 2023-03-13

## 2023-04-12 ENCOUNTER — RX RENEWAL (OUTPATIENT)
Age: 58
End: 2023-04-12

## 2023-04-20 ENCOUNTER — APPOINTMENT (OUTPATIENT)
Dept: HEART AND VASCULAR | Facility: CLINIC | Age: 58
End: 2023-04-20

## 2023-05-25 ENCOUNTER — RX RENEWAL (OUTPATIENT)
Age: 58
End: 2023-05-25

## 2023-05-25 ENCOUNTER — APPOINTMENT (OUTPATIENT)
Dept: HEART AND VASCULAR | Facility: CLINIC | Age: 58
End: 2023-05-25
Payer: COMMERCIAL

## 2023-05-25 ENCOUNTER — NON-APPOINTMENT (OUTPATIENT)
Age: 58
End: 2023-05-25

## 2023-05-25 VITALS
SYSTOLIC BLOOD PRESSURE: 112 MMHG | DIASTOLIC BLOOD PRESSURE: 80 MMHG | HEIGHT: 68 IN | OXYGEN SATURATION: 96 % | HEART RATE: 72 BPM | TEMPERATURE: 98.1 F | BODY MASS INDEX: 31.67 KG/M2 | WEIGHT: 209 LBS

## 2023-05-25 PROCEDURE — 99214 OFFICE O/P EST MOD 30 MIN: CPT | Mod: 25

## 2023-05-25 PROCEDURE — 36415 COLL VENOUS BLD VENIPUNCTURE: CPT

## 2023-05-25 PROCEDURE — 93000 ELECTROCARDIOGRAM COMPLETE: CPT

## 2023-05-25 RX ORDER — FOLIC ACID 1 MG/1
1 TABLET ORAL DAILY
Qty: 90 | Refills: 1 | Status: ACTIVE | COMMUNITY
Start: 2021-05-13 | End: 1900-01-01

## 2023-05-25 NOTE — ASSESSMENT
[FreeTextEntry1] : An EKG was performed to evaluate for arrhythmia and ischemia.\par \par Labs were drawn in the office and sent\par \par Hypertension--We discussed a goal of /80 or lower in the office. BP  at   goal\par \par Hyperlipidemia-- on statin\par \par I asked him to return for an Echocardiogram\par \par I encouraged continued risk factor reduction and gradual increase in aerobic activity as tolerated\par \par 31   minutes were spent discussing cardiac risk excluding procedure time \par

## 2023-05-25 NOTE — HISTORY OF PRESENT ILLNESS
[FreeTextEntry1] : 57 year male who comes to followup his BP. He notes knee pain after walking subway stairs or long distances. He feels a muscle cramp. He has skin tag at his right axilla. He denies having any chest pain, SOB, YOUNG, dizziness, palpitations, orthopnea, PND or syncope

## 2023-05-26 LAB
ALBUMIN SERPL ELPH-MCNC: 5 G/DL
ALP BLD-CCNC: 74 U/L
ALT SERPL-CCNC: 27 U/L
ANION GAP SERPL CALC-SCNC: 18 MMOL/L
AST SERPL-CCNC: 38 U/L
BILIRUB SERPL-MCNC: 0.5 MG/DL
BUN SERPL-MCNC: 39 MG/DL
CALCIUM SERPL-MCNC: 10.6 MG/DL
CHLORIDE SERPL-SCNC: 100 MMOL/L
CHOLEST SERPL-MCNC: 208 MG/DL
CO2 SERPL-SCNC: 20 MMOL/L
CREAT SERPL-MCNC: 1.13 MG/DL
EGFR: 75 ML/MIN/1.73M2
ESTIMATED AVERAGE GLUCOSE: 117 MG/DL
GLUCOSE SERPL-MCNC: 102 MG/DL
HBA1C MFR BLD HPLC: 5.7 %
HDLC SERPL-MCNC: 60 MG/DL
LDLC SERPL CALC-MCNC: 126 MG/DL
NONHDLC SERPL-MCNC: 148 MG/DL
POTASSIUM SERPL-SCNC: 4.4 MMOL/L
PROT SERPL-MCNC: 9 G/DL
SODIUM SERPL-SCNC: 139 MMOL/L
TRIGL SERPL-MCNC: 110 MG/DL
TSH SERPL-ACNC: 1.31 UIU/ML

## 2023-05-30 LAB — M PROTEIN SPEC IFE-MCNC: NORMAL

## 2023-07-17 ENCOUNTER — NON-APPOINTMENT (OUTPATIENT)
Age: 58
End: 2023-07-17

## 2023-07-17 ENCOUNTER — APPOINTMENT (OUTPATIENT)
Dept: HEART AND VASCULAR | Facility: CLINIC | Age: 58
End: 2023-07-17
Payer: COMMERCIAL

## 2023-07-17 VITALS
DIASTOLIC BLOOD PRESSURE: 82 MMHG | TEMPERATURE: 98.4 F | BODY MASS INDEX: 40.82 KG/M2 | SYSTOLIC BLOOD PRESSURE: 120 MMHG | OXYGEN SATURATION: 97 % | HEIGHT: 60 IN | WEIGHT: 207.94 LBS | HEART RATE: 84 BPM

## 2023-07-17 DIAGNOSIS — E78.5 HYPERLIPIDEMIA, UNSPECIFIED: ICD-10-CM

## 2023-07-17 PROCEDURE — 93306 TTE W/DOPPLER COMPLETE: CPT

## 2023-07-17 PROCEDURE — 99212 OFFICE O/P EST SF 10 MIN: CPT | Mod: 25

## 2023-07-17 PROCEDURE — 93880 EXTRACRANIAL BILAT STUDY: CPT

## 2023-07-17 NOTE — ASSESSMENT
[FreeTextEntry1] : An EKG was performed to evaluate for arrhythmia and ischemia.\par \par At the time of the patient's visit a Carotid Doppler was performed to evaluate for PVD. At the time of the visit the results were reviewed with patient\par \par At the time of the patient's visit an Echocardiogram was performed to evaluate LV function. At the time of the visit the results were reviewed with patient\par \par I suggested that he return for an Echocardiogram with contrast\par \par I encouraged continued risk factor reduction and gradual increase in aerobic activity as tolerated\par \par  11  minutes were spent discussing cardiac risk excluding procedure time

## 2023-09-22 ENCOUNTER — NON-APPOINTMENT (OUTPATIENT)
Age: 58
End: 2023-09-22

## 2023-09-22 ENCOUNTER — APPOINTMENT (OUTPATIENT)
Dept: HEART AND VASCULAR | Facility: CLINIC | Age: 58
End: 2023-09-22
Payer: COMMERCIAL

## 2023-09-22 VITALS
RESPIRATION RATE: 16 BRPM | BODY MASS INDEX: 23.83 KG/M2 | HEIGHT: 78 IN | TEMPERATURE: 99.2 F | HEART RATE: 77 BPM | DIASTOLIC BLOOD PRESSURE: 84 MMHG | WEIGHT: 206 LBS | OXYGEN SATURATION: 97 % | SYSTOLIC BLOOD PRESSURE: 140 MMHG

## 2023-09-22 VITALS — SYSTOLIC BLOOD PRESSURE: 136 MMHG | DIASTOLIC BLOOD PRESSURE: 80 MMHG

## 2023-09-22 DIAGNOSIS — I51.7 CARDIOMEGALY: ICD-10-CM

## 2023-09-22 DIAGNOSIS — I10 ESSENTIAL (PRIMARY) HYPERTENSION: ICD-10-CM

## 2023-09-22 PROCEDURE — 99214 OFFICE O/P EST MOD 30 MIN: CPT | Mod: 25

## 2023-09-22 PROCEDURE — 93000 ELECTROCARDIOGRAM COMPLETE: CPT

## 2023-09-22 PROCEDURE — 36415 COLL VENOUS BLD VENIPUNCTURE: CPT

## 2023-09-26 LAB
ALBUMIN SERPL ELPH-MCNC: 4.9 G/DL
ALP BLD-CCNC: 76 U/L
ALT SERPL-CCNC: 21 U/L
ANION GAP SERPL CALC-SCNC: 13 MMOL/L
AST SERPL-CCNC: 20 U/L
BASOPHILS # BLD AUTO: 0.07 K/UL
BASOPHILS NFR BLD AUTO: 0.9 %
BILIRUB SERPL-MCNC: 0.3 MG/DL
BUN SERPL-MCNC: 19 MG/DL
CALCIUM SERPL-MCNC: 10.1 MG/DL
CHLORIDE SERPL-SCNC: 101 MMOL/L
CHOLEST SERPL-MCNC: 194 MG/DL
CO2 SERPL-SCNC: 24 MMOL/L
CREAT SERPL-MCNC: 0.96 MG/DL
EGFR: 92 ML/MIN/1.73M2
EOSINOPHIL # BLD AUTO: 0.29 K/UL
EOSINOPHIL NFR BLD AUTO: 3.7 %
ESTIMATED AVERAGE GLUCOSE: 120 MG/DL
GLUCOSE SERPL-MCNC: 138 MG/DL
HBA1C MFR BLD HPLC: 5.8 %
HCT VFR BLD CALC: 44 %
HDLC SERPL-MCNC: 42 MG/DL
HGB BLD-MCNC: 14.7 G/DL
IMM GRANULOCYTES NFR BLD AUTO: 0.3 %
LDLC SERPL CALC-MCNC: 106 MG/DL
LYMPHOCYTES # BLD AUTO: 1.88 K/UL
LYMPHOCYTES NFR BLD AUTO: 23.9 %
MAN DIFF?: NORMAL
MCHC RBC-ENTMCNC: 30.8 PG
MCHC RBC-ENTMCNC: 33.4 GM/DL
MCV RBC AUTO: 92.1 FL
MONOCYTES # BLD AUTO: 0.63 K/UL
MONOCYTES NFR BLD AUTO: 8 %
NEUTROPHILS # BLD AUTO: 4.99 K/UL
NEUTROPHILS NFR BLD AUTO: 63.2 %
NONHDLC SERPL-MCNC: 153 MG/DL
PLATELET # BLD AUTO: 238 K/UL
POTASSIUM SERPL-SCNC: 4.3 MMOL/L
PROT SERPL-MCNC: 8.5 G/DL
RBC # BLD: 4.78 M/UL
RBC # FLD: 11.9 %
SODIUM SERPL-SCNC: 138 MMOL/L
TRIGL SERPL-MCNC: 270 MG/DL
TSH SERPL-ACNC: 2.11 UIU/ML
WBC # FLD AUTO: 7.88 K/UL

## 2023-10-11 ENCOUNTER — APPOINTMENT (OUTPATIENT)
Dept: HEART AND VASCULAR | Facility: CLINIC | Age: 58
End: 2023-10-11
Payer: MEDICARE

## 2023-10-11 DIAGNOSIS — I35.1 NONRHEUMATIC AORTIC (VALVE) INSUFFICIENCY: ICD-10-CM

## 2023-10-11 PROCEDURE — ZZZZZ: CPT

## 2023-10-11 PROCEDURE — 93306 TTE W/DOPPLER COMPLETE: CPT

## 2023-11-27 ENCOUNTER — RX RENEWAL (OUTPATIENT)
Age: 58
End: 2023-11-27

## 2024-05-20 ENCOUNTER — RX RENEWAL (OUTPATIENT)
Age: 59
End: 2024-05-20

## 2024-05-20 RX ORDER — ROSUVASTATIN CALCIUM 5 MG/1
5 TABLET, FILM COATED ORAL
Qty: 90 | Refills: 1 | Status: ACTIVE | COMMUNITY
Start: 2020-09-08 | End: 1900-01-01

## 2024-11-12 ENCOUNTER — RX RENEWAL (OUTPATIENT)
Age: 59
End: 2024-11-12

## 2024-11-14 ENCOUNTER — RX RENEWAL (OUTPATIENT)
Age: 59
End: 2024-11-14